# Patient Record
Sex: FEMALE | Race: WHITE | NOT HISPANIC OR LATINO | Employment: OTHER | URBAN - METROPOLITAN AREA
[De-identification: names, ages, dates, MRNs, and addresses within clinical notes are randomized per-mention and may not be internally consistent; named-entity substitution may affect disease eponyms.]

---

## 2023-06-07 ENCOUNTER — HOSPITAL ENCOUNTER (OUTPATIENT)
Dept: DATA CONVERSION | Facility: HOSPITAL | Age: 73
End: 2023-06-07
Attending: SURGERY | Admitting: SURGERY
Payer: MEDICARE

## 2023-06-07 DIAGNOSIS — I10 ESSENTIAL (PRIMARY) HYPERTENSION: ICD-10-CM

## 2023-06-07 DIAGNOSIS — D64.9 ANEMIA, UNSPECIFIED: ICD-10-CM

## 2023-06-07 DIAGNOSIS — E78.5 HYPERLIPIDEMIA, UNSPECIFIED: ICD-10-CM

## 2023-06-07 DIAGNOSIS — Z86.16 PERSONAL HISTORY OF COVID-19: ICD-10-CM

## 2023-06-07 DIAGNOSIS — C18.9 MALIGNANT NEOPLASM OF COLON, UNSPECIFIED (MULTI): ICD-10-CM

## 2023-06-07 DIAGNOSIS — G62.9 POLYNEUROPATHY, UNSPECIFIED: ICD-10-CM

## 2023-08-16 ENCOUNTER — HOSPITAL ENCOUNTER (OUTPATIENT)
Dept: DATA CONVERSION | Facility: HOSPITAL | Age: 73
End: 2023-08-16
Attending: INTERNAL MEDICINE | Admitting: INTERNAL MEDICINE
Payer: MEDICARE

## 2023-08-16 DIAGNOSIS — D17.79 BENIGN LIPOMATOUS NEOPLASM OF OTHER SITES: ICD-10-CM

## 2023-08-16 DIAGNOSIS — D12.2 BENIGN NEOPLASM OF ASCENDING COLON: ICD-10-CM

## 2023-08-16 DIAGNOSIS — D12.8 BENIGN NEOPLASM OF RECTUM: ICD-10-CM

## 2023-08-16 DIAGNOSIS — K64.8 OTHER HEMORRHOIDS: ICD-10-CM

## 2023-08-16 DIAGNOSIS — K64.4 RESIDUAL HEMORRHOIDAL SKIN TAGS: ICD-10-CM

## 2023-08-16 DIAGNOSIS — K56.699 OTHER INTESTINAL OBSTRUCTION UNSPECIFIED AS TO PARTIAL VERSUS COMPLETE OBSTRUCTION (MULTI): ICD-10-CM

## 2023-08-16 DIAGNOSIS — Z98.0 INTESTINAL BYPASS AND ANASTOMOSIS STATUS: ICD-10-CM

## 2023-08-16 DIAGNOSIS — Z85.038 PERSONAL HISTORY OF OTHER MALIGNANT NEOPLASM OF LARGE INTESTINE: ICD-10-CM

## 2023-08-16 DIAGNOSIS — Z86.010 PERSONAL HISTORY OF COLONIC POLYPS: ICD-10-CM

## 2023-08-16 DIAGNOSIS — Z12.11 ENCOUNTER FOR SCREENING FOR MALIGNANT NEOPLASM OF COLON: ICD-10-CM

## 2023-08-17 LAB
COMPLETE PATHOLOGY REPORT: NORMAL
CONVERTED CLINICAL DIAGNOSIS-HISTORY: NORMAL
CONVERTED FINAL DIAGNOSIS: NORMAL
CONVERTED FINAL REPORT PDF LINK TO COPY AND PASTE: NORMAL
CONVERTED GROSS DESCRIPTION: NORMAL

## 2023-09-07 VITALS — WEIGHT: 177.03 LBS | BODY MASS INDEX: 25.34 KG/M2 | HEIGHT: 70 IN

## 2023-09-29 VITALS
WEIGHT: 177.03 LBS | TEMPERATURE: 97.9 F | SYSTOLIC BLOOD PRESSURE: 137 MMHG | RESPIRATION RATE: 18 BRPM | DIASTOLIC BLOOD PRESSURE: 99 MMHG | BODY MASS INDEX: 25.34 KG/M2 | HEART RATE: 73 BPM | HEIGHT: 70 IN

## 2023-09-30 NOTE — H&P
History & Physical Reviewed:   I have reviewed the History and Physical dated:  23-May-2023   History and Physical reviewed and relevant findings noted. Patient examined to review pertinent physical  findings.: No significant changes   Home Medications Reviewed: no changes noted   Allergies Reviewed: no changes noted       ERAS (Enhanced Recovery After Surgery):  ·  ERAS Patient: no     Consent:   COVID-19 Consent:  ·  COVID-19 Risk Consent Surgeon has reviewed key risks related to the risk of guille COVID-19 and if they contract COVID-19 what the risks are.       Electronic Signatures:  Sushma Hendricks)  (Signed 07-Jun-2023 09:31)   Authored: History & Physical Reviewed, ERAS, Consent,  Note Completion      Last Updated: 07-Jun-2023 09:31 by Sushma Hendricks)

## 2023-09-30 NOTE — H&P
History of Present Illness:   History Present Illness:  Reason for surgery: History of colon cancer, tubulovillous  adenoma   HPI:    Patient is scheduled for surveillance colonoscopy.  Diagnosed colon cancer SP sigmoid resection SP colostomy in November 2022.  Large tubulovillous polyp removed from ascending colon on 1/16/2023    Allergies:        Allergies:  ·  penicillins : Anaphylaxis    Home Medication Review:   Home Medications Reviewed: yes     Impression/Procedure:   ·  Impression and Planned Procedure: Colonoscopy       ERAS (Enhanced Recovery After Surgery):  ·  ERAS Patient: no       Vital Signs:  Temperature C: 36.6 degrees C   Temperature F: 97.8 degrees F   Heart Rate: 73 beats per minute   Respiratory Rate: 18 breath per minute   Blood Pressure Systolic: 137 mm/Hg   Blood Pressure Diastolic: 99 mm/Hg     Physical Exam by System:    Respiratory/Thorax: Patent airways, CTAB, normal  breath sounds with good chest expansion, thorax symmetric   Cardiovascular: Regular, rate and rhythm, no murmurs,  2+ equal pulses of the extremities, normal S 1and S 2     Consent:   COVID-19 Consent:  ·  COVID-19 Risk Consent Surgeon has reviewed key risks related to the risk of guille COVID-19 and if they contract COVID-19 what the risks are.       Electronic Signatures:  Doug Frederick)  (Signed 16-Aug-2023 09:32)   Authored: History of Present Illness, Allergies, Home  Medication Review, Impression/Procedure, ERAS, Physical Exam, Consent, Note Completion      Last Updated: 16-Aug-2023 09:32 by Doug Frederick)

## 2023-10-02 NOTE — OP NOTE
Post Operative Note:     PreOp Diagnosis: mediport in place   Post-Procedure Diagnosis: same   Procedure: 1. mediport removal   2.   3.   4.   5.   Surgeon: Ruthie   Resident/Fellow/Other Assistant:    Anesthesia: MAC   Estimated Blood Loss (mL): 2   Specimen: no   Complications: none   Findings: see below   Patient Returned To/Condition: pacu, stable   Additional Details: Informed consent was obtained.   The patient was lying supine on operating bed. After MAC anesthesia was obtained, her right chest and neck was prepped   and draped.  Local anesthetics was injected around the port.  Skin incision was   made at the previous scar.  It was carried down through the subcutaneous   tissue.  The port and catheter were identified.  The catheter was controlled   with a hemostat. The port was then removed from the pocket. We then removed the catheter with no difficulty. Manual pressure was applied at the neck vein entry site. The track   was closed with 3-0 Vicryl. We then excised the capsule in the pocket. the   wound was irrigated. Hemostasis was confirmed. the wound was closed in layers: 3-0 Vicryl for subcutaneous tissue and 4-0 Vicryl for the skin. Skin glue was then applied.    patient tolerated the procedure well.       Electronic Signatures:  Sushma Hendricks)  (Signed 07-Jun-2023 10:22)   Authored: Post Operative Note, Note Completion      Last Updated: 07-Jun-2023 10:22 by Sushma Hendricks)

## 2023-10-12 PROBLEM — R10.9 ABDOMINAL PAIN, ACUTE: Status: ACTIVE | Noted: 2023-10-12

## 2023-10-12 PROBLEM — S72.002A CLOSED FRACTURE OF LEFT HIP (MULTI): Status: ACTIVE | Noted: 2023-10-12

## 2023-10-12 PROBLEM — K63.89 MASS OF COLON: Status: ACTIVE | Noted: 2023-10-12

## 2023-10-12 PROBLEM — Z90.49 HISTORY OF PARTIAL COLECTOMY: Status: ACTIVE | Noted: 2023-10-12

## 2023-10-12 PROBLEM — K57.32 DIVERTICULITIS OF COLON: Status: ACTIVE | Noted: 2023-10-12

## 2023-10-12 PROBLEM — C18.9 ADENOCARCINOMA, COLON (MULTI): Status: ACTIVE | Noted: 2023-10-12

## 2023-10-12 RX ORDER — ACETAMINOPHEN 500 MG
2 TABLET ORAL EVERY 6 HOURS PRN
COMMUNITY
Start: 2021-11-12

## 2023-10-12 RX ORDER — CHLORHEXIDINE GLUCONATE ORAL RINSE 1.2 MG/ML
SOLUTION DENTAL
COMMUNITY
Start: 2022-12-09 | End: 2023-10-13 | Stop reason: SDUPTHER

## 2023-10-12 RX ORDER — RANITIDINE HCL 150 MG
TABLET ORAL
COMMUNITY
Start: 2021-11-16 | End: 2023-10-13 | Stop reason: ALTCHOICE

## 2023-10-12 RX ORDER — SYRINGE, DISPOSABLE, 10 ML
SYRINGE, EMPTY DISPOSABLE MISCELLANEOUS
COMMUNITY
Start: 2021-11-16 | End: 2023-10-13 | Stop reason: ALTCHOICE

## 2023-10-12 RX ORDER — METRONIDAZOLE 500 MG/1
TABLET ORAL
COMMUNITY
Start: 2022-11-29 | End: 2023-10-13 | Stop reason: ALTCHOICE

## 2023-10-12 RX ORDER — DOCUSATE SODIUM 100 MG/1
1 CAPSULE, LIQUID FILLED ORAL 2 TIMES DAILY PRN
COMMUNITY
Start: 2022-08-31 | End: 2023-10-13 | Stop reason: ALTCHOICE

## 2023-10-12 RX ORDER — NEOMYCIN SULFATE 500 MG/1
TABLET ORAL
COMMUNITY
Start: 2022-11-30 | End: 2023-10-13 | Stop reason: ALTCHOICE

## 2023-10-13 ENCOUNTER — ANCILLARY PROCEDURE (OUTPATIENT)
Dept: PREADMISSION TESTING | Facility: HOSPITAL | Age: 73
End: 2023-10-13
Payer: MEDICARE

## 2023-10-13 VITALS
SYSTOLIC BLOOD PRESSURE: 122 MMHG | HEART RATE: 69 BPM | DIASTOLIC BLOOD PRESSURE: 61 MMHG | HEIGHT: 70 IN | OXYGEN SATURATION: 100 % | WEIGHT: 185.19 LBS | TEMPERATURE: 97.7 F | BODY MASS INDEX: 26.51 KG/M2 | RESPIRATION RATE: 18 BRPM

## 2023-10-13 DIAGNOSIS — C18.9 MALIGNANT NEOPLASM OF COLON, UNSPECIFIED PART OF COLON (MULTI): ICD-10-CM

## 2023-10-13 DIAGNOSIS — K57.32 DIVERTICULITIS OF COLON: ICD-10-CM

## 2023-10-13 DIAGNOSIS — Z01.818 PREOP EXAMINATION: Primary | ICD-10-CM

## 2023-10-13 DIAGNOSIS — Z90.49 H/O PARTIAL RESECTION OF COLON: ICD-10-CM

## 2023-10-13 LAB
ABO GROUP (TYPE) IN BLOOD: NORMAL
ALBUMIN SERPL BCP-MCNC: 3.9 G/DL (ref 3.4–5)
ALP SERPL-CCNC: 83 U/L (ref 33–136)
ALT SERPL W P-5'-P-CCNC: 14 U/L (ref 7–45)
ANION GAP SERPL CALC-SCNC: 10 MMOL/L (ref 10–20)
ANTIBODY SCREEN: NORMAL
AST SERPL W P-5'-P-CCNC: 19 U/L (ref 9–39)
BASOPHILS # BLD AUTO: 0.04 X10*3/UL (ref 0–0.1)
BASOPHILS NFR BLD AUTO: 0.6 %
BILIRUB SERPL-MCNC: 0.4 MG/DL (ref 0–1.2)
BUN SERPL-MCNC: 19 MG/DL (ref 6–23)
CALCIUM SERPL-MCNC: 9.1 MG/DL (ref 8.6–10.3)
CHLORIDE SERPL-SCNC: 103 MMOL/L (ref 98–107)
CO2 SERPL-SCNC: 28 MMOL/L (ref 21–32)
CREAT SERPL-MCNC: 0.99 MG/DL (ref 0.5–1.05)
EOSINOPHIL # BLD AUTO: 0.11 X10*3/UL (ref 0–0.4)
EOSINOPHIL NFR BLD AUTO: 1.8 %
ERYTHROCYTE [DISTWIDTH] IN BLOOD BY AUTOMATED COUNT: 14.2 % (ref 11.5–14.5)
GFR SERPL CREATININE-BSD FRML MDRD: 60 ML/MIN/1.73M*2
GLUCOSE SERPL-MCNC: 93 MG/DL (ref 74–99)
HCT VFR BLD AUTO: 37.4 % (ref 36–46)
HGB BLD-MCNC: 11.6 G/DL (ref 12–16)
IMM GRANULOCYTES # BLD AUTO: 0.01 X10*3/UL (ref 0–0.5)
IMM GRANULOCYTES NFR BLD AUTO: 0.2 % (ref 0–0.9)
INR PPP: 1 (ref 0.9–1.1)
LYMPHOCYTES # BLD AUTO: 1.6 X10*3/UL (ref 0.8–3)
LYMPHOCYTES NFR BLD AUTO: 25.5 %
MCH RBC QN AUTO: 27.1 PG (ref 26–34)
MCHC RBC AUTO-ENTMCNC: 31 G/DL (ref 32–36)
MCV RBC AUTO: 87 FL (ref 80–100)
MONOCYTES # BLD AUTO: 0.43 X10*3/UL (ref 0.05–0.8)
MONOCYTES NFR BLD AUTO: 6.9 %
NEUTROPHILS # BLD AUTO: 4.08 X10*3/UL (ref 1.6–5.5)
NEUTROPHILS NFR BLD AUTO: 65 %
NRBC BLD-RTO: 0 /100 WBCS (ref 0–0)
PLATELET # BLD AUTO: 202 X10*3/UL (ref 150–450)
PMV BLD AUTO: 9.8 FL (ref 7.5–11.5)
POTASSIUM SERPL-SCNC: 5 MMOL/L (ref 3.5–5.3)
PROT SERPL-MCNC: 6.7 G/DL (ref 6.4–8.2)
PROTHROMBIN TIME: 11.6 SECONDS (ref 9.8–12.8)
RBC # BLD AUTO: 4.28 X10*6/UL (ref 4–5.2)
RH FACTOR (ANTIGEN D): NORMAL
SODIUM SERPL-SCNC: 136 MMOL/L (ref 136–145)
WBC # BLD AUTO: 6.3 X10*3/UL (ref 4.4–11.3)

## 2023-10-13 PROCEDURE — 85610 PROTHROMBIN TIME: CPT

## 2023-10-13 PROCEDURE — 93010 ELECTROCARDIOGRAM REPORT: CPT | Performed by: INTERNAL MEDICINE

## 2023-10-13 PROCEDURE — 87081 CULTURE SCREEN ONLY: CPT | Mod: CMCLAB,GEALAB

## 2023-10-13 PROCEDURE — 85025 COMPLETE CBC W/AUTO DIFF WBC: CPT

## 2023-10-13 PROCEDURE — 99213 OFFICE O/P EST LOW 20 MIN: CPT | Performed by: PHYSICIAN ASSISTANT

## 2023-10-13 PROCEDURE — 93005 ELECTROCARDIOGRAM TRACING: CPT

## 2023-10-13 PROCEDURE — 80053 COMPREHEN METABOLIC PANEL: CPT

## 2023-10-13 PROCEDURE — 86901 BLOOD TYPING SEROLOGIC RH(D): CPT

## 2023-10-13 RX ORDER — FERROUS SULFATE 325(65) MG
65 TABLET, DELAYED RELEASE (ENTERIC COATED) ORAL
COMMUNITY

## 2023-10-13 RX ORDER — CHLORHEXIDINE GLUCONATE ORAL RINSE 1.2 MG/ML
15 SOLUTION DENTAL DAILY
Qty: 473 ML | Refills: 0 | Status: SHIPPED | OUTPATIENT
Start: 2023-10-13 | End: 2024-01-11

## 2023-10-13 RX ORDER — CHLORHEXIDINE GLUCONATE ORAL RINSE 1.2 MG/ML
15 SOLUTION DENTAL DAILY
Qty: 473 ML | Refills: 0 | Status: SHIPPED | OUTPATIENT
Start: 2023-10-13 | End: 2023-10-13 | Stop reason: SDUPTHER

## 2023-10-13 RX ORDER — CYANOCOBALAMIN (VITAMIN B-12) 250 MCG
250 TABLET ORAL DAILY
COMMUNITY

## 2023-10-13 ASSESSMENT — DUKE ACTIVITY SCORE INDEX (DASI)
CAN YOU DO LIGHT WORK AROUND THE HOUSE LIKE DUSTING OR WASHING DISHES: YES
CAN YOU PARTICIPATE IN STRENOUS SPORTS LIKE SWIMMING, SINGLES TENNIS, FOOTBALL, BASKETBALL, OR SKIING: NO
CAN YOU TAKE CARE OF YOURSELF (EAT, DRESS, BATHE, OR USE TOILET): YES
CAN YOU DO HEAVY WORK AROUND THE HOUSE LIKE SCRUBBING FLOORS OR LIFTING AND MOVING HEAVY FURNITURE: YES
TOTAL_SCORE: 42.7
CAN YOU WALK A BLOCK OR TWO ON LEVEL GROUND: YES
CAN YOU DO YARD WORK LIKE RAKING LEAVES, WEEDING OR PUSHING A MOWER: YES
CAN YOU RUN A SHORT DISTANCE: NO
CAN YOU HAVE SEXUAL RELATIONS: YES
CAN YOU PARTICIPATE IN MODERATE RECREATIONAL ACTIVITIES LIKE GOLF, BOWLING, DANCING, DOUBLES TENNIS OR THROWING A BASEBALL OR FOOTBALL: YES
CAN YOU DO MODERATE WORK AROUND THE HOUSE LIKE VACUUMING, SWEEPING FLOORS OR CARRYING GROCERIES: YES
CAN YOU CLIMB A FLIGHT OF STAIRS OR WALK UP A HILL: YES
DASI METS SCORE: 8
CAN YOU WALK INDOORS, SUCH AS AROUND YOUR HOUSE: YES

## 2023-10-13 ASSESSMENT — CHADS2 SCORE
CHF: NO
PRIOR STROKE OR TIA OR THROMBOEMBOLISM: NO
CHADS2 SCORE: 0
HYPERTENSION: NO
DIABETES: NO
AGE GREATER THAN OR EQUAL TO 75: NO

## 2023-10-13 ASSESSMENT — LIFESTYLE VARIABLES: SMOKING_STATUS: NONSMOKER

## 2023-10-13 ASSESSMENT — ACTIVITIES OF DAILY LIVING (ADL): ADL_SCORE: 0

## 2023-10-13 NOTE — CPM/PAT H&P
CPM/PAT Evaluation       Name: Aurelia Thayer (Aurelia Thayer)  /Age: 1950/73 y.o.     In-Person       Chief Complaint: colon polyp    Patient has a history of colon cancer with resection in . Treated with chemo and radiation s/p colon resection. Recent colonoscopy reveals a flat, abnormal polyp that is not able to be removed through colonoscopy.         Past Medical History:   Diagnosis Date    Colon cancer (CMS/HCC)     Peripheral neuropathy        Past Surgical History:   Procedure Laterality Date    APPENDECTOMY      CHOLECYSTECTOMY      CT GUIDED PERCUTANEOUS PERITONEAL OR RETROPERITONEAL FLUID COLLECTION DRAINAGE  2021    CT GUIDED PERCUTANEOUS PERITONEAL OR RETROPERITONEAL FLUID COLLECTION DRAINAGE 2021 [facility] CLINICAL LEGACY    ECTOPIC PREGNANCY SURGERY      MEDIPORT      ORIF HIP FRACTURE      left     ORIF RADIAL SHAFT FRACTURE      OTHER SURGICAL HISTORY  2021    Colectomy subtotal    OTHER SURGICAL HISTORY  2021    Colostomy       Patient  reports being sexually active.    Family History   Problem Relation Name Age of Onset    Heart attack Father  60 - 69       Allergies   Allergen Reactions    Penicillins Anaphylaxis       Prior to Admission medications    Medication Sig Start Date End Date Taking? Authorizing Provider   acetaminophen (Tylenol) 500 mg tablet Take 2 tablets (1,000 mg) by mouth every 6 hours if needed. 21   Historical Provider, MD   chlorhexidine (Peridex) 0.12 % solution Use 15 mL in the mouth or throat once daily. Swish and spit one capful the night before surgery and morning  of surgery 10/13/23 1/11/24  [facility]   cyanocobalamin (Vitamin B-12) 250 mcg tablet Take 1 tablet (250 mcg) by mouth once daily.    Historical Provider, MD   ECHINACEA ORAL Take 1 tablet by mouth every other day.    Historical Provider, MD   ferrous sulfate 325 (65 Fe) MG EC tablet Take 65 mg by mouth once daily with a meal. Do not crush,  chew, or split.    Historical Provider, MD   chlorhexidine (Peridex) 0.12 % solution Take per directed 12/9/22 10/13/23  Historical Provider, MD   chlorhexidine (Peridex) 0.12 % solution Use 15 mL in the mouth or throat once daily. Swish and spit one capful the night before surgery and morning  of surgery 10/13/23 10/13/23  [facility]   docusate sodium (Colace) 100 mg capsule Take 1 capsule (100 mg) by mouth 2 times a day as needed for constipation. 8/31/22 10/13/23  Historical Provider, MD   metroNIDAZOLE (Flagyl) 500 mg tablet Take per directed 11/29/22 10/13/23  Historical Provider, MD   neomycin (Mycifradin) 500 mg tablet Take per directed 11/30/22 10/13/23  Historical Provider, MD   soft lens rinse,store solution (Saline Solution) solution Take per directed 11/16/21 10/13/23  Historical Provider, MD   syringe, disposable, (Easy Glide Luer Lock Syringe) 10 mL syringe Take per directed 11/16/21 10/13/23  Historical Provider, MD        [unfilled]    Physical Exam  Vitals and nursing note reviewed.   Constitutional:       Appearance: Normal appearance.   HENT:      Head: Normocephalic and atraumatic.      Nose: Nose normal.      Mouth/Throat:      Mouth: Mucous membranes are moist.      Pharynx: Oropharynx is clear.   Eyes:      Conjunctiva/sclera: Conjunctivae normal.      Pupils: Pupils are equal, round, and reactive to light.   Cardiovascular:      Rate and Rhythm: Normal rate and regular rhythm.      Pulses: Normal pulses.      Heart sounds: Normal heart sounds.   Abdominal:      General: Abdomen is flat. Bowel sounds are normal.      Palpations: Abdomen is soft.   Musculoskeletal:         General: Normal range of motion.   Skin:     General: Skin is warm and dry.   Neurological:      General: No focal deficit present.      Mental Status: She is alert.   Psychiatric:         Mood and Affect: Mood normal.         Behavior: Behavior normal.          PAT AIRWAY:   Airway:     Neck ROM::   Full  normal        Vitals:    10/13/23 1035   BP: 122/61   Pulse: 69   Resp: 18   Temp: 36.5 °C (97.7 °F)   SpO2: 100%          DASI Risk Score      Flowsheet Row Most Recent Value   DASI SCORE 42.7   METS Score (Will be calculated only when all the questions are answered) 8          Caprini DVT Assessment      Flowsheet Row Most Recent Value   DVT Score 11   Current Status Major surgery planned, lasting 2-3 hours, Present cancer or chemotherapy   History Prior major surgery, Previous malignancy   Age 60-75 years   BMI 30 or less          Modified Frailty Index      Flowsheet Row Most Recent Value   Modified Frailty Index Calculator 0          CHADS2 Stroke Risk  Current as of 10 minutes ago        N/A 3 - 100%: High Risk   2 - 3%: Medium Risk   0 - 2%: Low Risk     Last Change: N/A          This score determines the patient's risk of having a stroke if the patient has atrial fibrillation.        This score is not applicable to this patient. Components are not calculated.          Revised Cardiac Risk Index      Flowsheet Row Most Recent Value   Revised Cardiac Risk Calculator 0          Apfel Simplified Score      Flowsheet Row Most Recent Value   Apfel Simplified Score Calculator 3          Risk Analysis Index Results This Encounter         10/13/2023  1049             HAMMONDS Cancer History: Patient indicates history of cancer    Total Risk Analysis Index Score Without Cancer: 22    Total Risk Analysis Index Score: 34          Stop Bang Score      Flowsheet Row Most Recent Value   Do you snore loudly? 1   Do you often feel tired or fatigued after your sleep? 0   Has anyone ever observed you stop breathing in your sleep? 0   Do you have or are you being treated for high blood pressure? 0   Recent BMI (Calculated) 25.4   Is BMI greater than 35 kg/m2? 0=No   Age older than 50 years old? 1=Yes            Assessment and Plan:   Patient is a 73 year old male/female referred to PAT by Dr. Hendricks Anticipating a colon resection  on  10/20/23.     PMH significant for:   Colon cancer with colon resection followed by colostomy and reversal of colostomy.     Anesthesia issues: none    H/O DVT: no    Sleep apnea: no     H/O transfusions: no     EKG: 10/13/23 NSR    Clearances: none    Patient verbalized understanding of preop instructions given in PAT

## 2023-10-13 NOTE — H&P (VIEW-ONLY)
CPM/PAT Evaluation       Name: Aurelia Thayer (Aurelia Thayer)  /Age: 1950/73 y.o.     In-Person       Chief Complaint: colon polyp    Patient has a history of colon cancer with resection in . Treated with chemo and radiation s/p colon resection. Recent colonoscopy reveals a flat, abnormal polyp that is not able to be removed through colonoscopy.         Past Medical History:   Diagnosis Date    Colon cancer (CMS/HCC)     Peripheral neuropathy        Past Surgical History:   Procedure Laterality Date    APPENDECTOMY      CHOLECYSTECTOMY      CT GUIDED PERCUTANEOUS PERITONEAL OR RETROPERITONEAL FLUID COLLECTION DRAINAGE  2021    CT GUIDED PERCUTANEOUS PERITONEAL OR RETROPERITONEAL FLUID COLLECTION DRAINAGE 2021 [facility] CLINICAL LEGACY    ECTOPIC PREGNANCY SURGERY      MEDIPORT      ORIF HIP FRACTURE      left     ORIF RADIAL SHAFT FRACTURE      OTHER SURGICAL HISTORY  2021    Colectomy subtotal    OTHER SURGICAL HISTORY  2021    Colostomy       Patient  reports being sexually active.    Family History   Problem Relation Name Age of Onset    Heart attack Father  60 - 69       Allergies   Allergen Reactions    Penicillins Anaphylaxis       Prior to Admission medications    Medication Sig Start Date End Date Taking? Authorizing Provider   acetaminophen (Tylenol) 500 mg tablet Take 2 tablets (1,000 mg) by mouth every 6 hours if needed. 21   Historical Provider, MD   chlorhexidine (Peridex) 0.12 % solution Use 15 mL in the mouth or throat once daily. Swish and spit one capful the night before surgery and morning  of surgery 10/13/23 1/11/24  [facility]   cyanocobalamin (Vitamin B-12) 250 mcg tablet Take 1 tablet (250 mcg) by mouth once daily.    Historical Provider, MD   ECHINACEA ORAL Take 1 tablet by mouth every other day.    Historical Provider, MD   ferrous sulfate 325 (65 Fe) MG EC tablet Take 65 mg by mouth once daily with a meal. Do not crush,  chew, or split.    Historical Provider, MD   chlorhexidine (Peridex) 0.12 % solution Take per directed 12/9/22 10/13/23  Historical Provider, MD   chlorhexidine (Peridex) 0.12 % solution Use 15 mL in the mouth or throat once daily. Swish and spit one capful the night before surgery and morning  of surgery 10/13/23 10/13/23  [facility]   docusate sodium (Colace) 100 mg capsule Take 1 capsule (100 mg) by mouth 2 times a day as needed for constipation. 8/31/22 10/13/23  Historical Provider, MD   metroNIDAZOLE (Flagyl) 500 mg tablet Take per directed 11/29/22 10/13/23  Historical Provider, MD   neomycin (Mycifradin) 500 mg tablet Take per directed 11/30/22 10/13/23  Historical Provider, MD   soft lens rinse,store solution (Saline Solution) solution Take per directed 11/16/21 10/13/23  Historical Provider, MD   syringe, disposable, (Easy Glide Luer Lock Syringe) 10 mL syringe Take per directed 11/16/21 10/13/23  Historical Provider, MD        [unfilled]    Physical Exam  Vitals and nursing note reviewed.   Constitutional:       Appearance: Normal appearance.   HENT:      Head: Normocephalic and atraumatic.      Nose: Nose normal.      Mouth/Throat:      Mouth: Mucous membranes are moist.      Pharynx: Oropharynx is clear.   Eyes:      Conjunctiva/sclera: Conjunctivae normal.      Pupils: Pupils are equal, round, and reactive to light.   Cardiovascular:      Rate and Rhythm: Normal rate and regular rhythm.      Pulses: Normal pulses.      Heart sounds: Normal heart sounds.   Abdominal:      General: Abdomen is flat. Bowel sounds are normal.      Palpations: Abdomen is soft.   Musculoskeletal:         General: Normal range of motion.   Skin:     General: Skin is warm and dry.   Neurological:      General: No focal deficit present.      Mental Status: She is alert.   Psychiatric:         Mood and Affect: Mood normal.         Behavior: Behavior normal.          PAT AIRWAY:   Airway:     Neck ROM::   Full  normal        Vitals:    10/13/23 1035   BP: 122/61   Pulse: 69   Resp: 18   Temp: 36.5 °C (97.7 °F)   SpO2: 100%          DASI Risk Score      Flowsheet Row Most Recent Value   DASI SCORE 42.7   METS Score (Will be calculated only when all the questions are answered) 8          Caprini DVT Assessment      Flowsheet Row Most Recent Value   DVT Score 11   Current Status Major surgery planned, lasting 2-3 hours, Present cancer or chemotherapy   History Prior major surgery, Previous malignancy   Age 60-75 years   BMI 30 or less          Modified Frailty Index      Flowsheet Row Most Recent Value   Modified Frailty Index Calculator 0          CHADS2 Stroke Risk  Current as of 10 minutes ago        N/A 3 - 100%: High Risk   2 - 3%: Medium Risk   0 - 2%: Low Risk     Last Change: N/A          This score determines the patient's risk of having a stroke if the patient has atrial fibrillation.        This score is not applicable to this patient. Components are not calculated.          Revised Cardiac Risk Index      Flowsheet Row Most Recent Value   Revised Cardiac Risk Calculator 0          Apfel Simplified Score      Flowsheet Row Most Recent Value   Apfel Simplified Score Calculator 3          Risk Analysis Index Results This Encounter         10/13/2023  1049             HAMMONDS Cancer History: Patient indicates history of cancer    Total Risk Analysis Index Score Without Cancer: 22    Total Risk Analysis Index Score: 34          Stop Bang Score      Flowsheet Row Most Recent Value   Do you snore loudly? 1   Do you often feel tired or fatigued after your sleep? 0   Has anyone ever observed you stop breathing in your sleep? 0   Do you have or are you being treated for high blood pressure? 0   Recent BMI (Calculated) 25.4   Is BMI greater than 35 kg/m2? 0=No   Age older than 50 years old? 1=Yes            Assessment and Plan:   Patient is a 73 year old male/female referred to PAT by Dr. Hendricks Anticipating a colon resection  on  10/20/23.     PMH significant for:   Colon cancer with colon resection followed by colostomy and reversal of colostomy.     Anesthesia issues: none    H/O DVT: no    Sleep apnea: no     H/O transfusions: no     EKG: 10/13/23 NSR    Clearances: none    Patient verbalized understanding of preop instructions given in PAT

## 2023-10-13 NOTE — PREPROCEDURE INSTRUCTIONS
No outpatient medications have been marked as taking for the 10/13/23 encounter (Clinical Support) with [facility].     You will receive a phone call the day before your procedure  after 2pm, (or the Friday before your surgery if scheduled on a Monday.) Generally the hospital will be calling you with this information after that time.         You are not to eat after midnight the night before the surgery. You may have 8oz of a clear liquid up until 2 hours prior to arriving to the hospital. The exception is with medications you were instructed to take day of surgery.         You may take tylenol for pain/discomfort as needed.         **Stop taking all aspirins, ibuprofen (motrin/advil), naproxen (aleve/naprosyn) for one week prior to surgery.         You should not have alcoholic beverages for 24 hours before surgery.         You should not smoke 24 hours prior to surgery.         To help prevent surgical infections bathe/shower with dial soap the evening before surgery.         If you have any further questions please call [phone]                     NPO Instructions:    Do not eat any food after midnight the night before your surgery/procedure.    Additional Instructions:     Review your medication instructions, stop indicated medications

## 2023-10-13 NOTE — PREPROCEDURE INSTRUCTIONS
No outpatient medications have been marked as taking for the 10/13/23 encounter (Clinical Support) with [facility].   CHG oral rinse is used to kill a bacteria in the mouth known as Staphylococcus aureus. This reduces the risks of surgical site infections.         Using dental rinse: use the CHG oral rinse after you brush your teeth the night before and the morning of the surgery.     Use 1 capful (15ml), swish and gargle for at least 30 seconds. Do not swallow. Spit rinse out.         Do not rinse mouth with water, eat or drink after using CHG mouth rinse.         Possible side effects: CHG rinse will stick to plaque on teeth. Brush and floss just before use. Teeth brushing will help to avoid staining of plaque during use.         Any questions, please call [phone]                       NPO Instructions:    Do not eat any food after midnight the night before your surgery/procedure.    Additional Instructions:     Review your medication instructions, stop indicated medications

## 2023-10-16 LAB — STAPHYLOCOCCUS SPEC CULT: NORMAL

## 2023-10-19 ENCOUNTER — ANESTHESIA EVENT (OUTPATIENT)
Dept: OPERATING ROOM | Facility: HOSPITAL | Age: 73
DRG: 331 | End: 2023-10-19
Payer: MEDICARE

## 2023-10-20 ENCOUNTER — HOSPITAL ENCOUNTER (INPATIENT)
Facility: HOSPITAL | Age: 73
LOS: 4 days | Discharge: HOME | DRG: 331 | End: 2023-10-24
Attending: SURGERY | Admitting: SURGERY
Payer: MEDICARE

## 2023-10-20 ENCOUNTER — ANESTHESIA (OUTPATIENT)
Dept: OPERATING ROOM | Facility: HOSPITAL | Age: 73
DRG: 331 | End: 2023-10-20
Payer: MEDICARE

## 2023-10-20 DIAGNOSIS — Z90.49 HISTORY OF PARTIAL COLECTOMY: ICD-10-CM

## 2023-10-20 DIAGNOSIS — K63.5 POLYP OF ASCENDING COLON, UNSPECIFIED TYPE: Primary | ICD-10-CM

## 2023-10-20 PROBLEM — H54.7 VISION LOSS: Status: ACTIVE | Noted: 2023-10-20

## 2023-10-20 PROBLEM — G62.9 PERIPHERAL NEUROPATHY: Status: ACTIVE | Noted: 2023-10-20

## 2023-10-20 PROCEDURE — 2500000005 HC RX 250 GENERAL PHARMACY W/O HCPCS: Performed by: SURGERY

## 2023-10-20 PROCEDURE — 88307 TISSUE EXAM BY PATHOLOGIST: CPT | Mod: TC,GEALAB | Performed by: SURGERY

## 2023-10-20 PROCEDURE — 88307 TISSUE EXAM BY PATHOLOGIST: CPT | Performed by: STUDENT IN AN ORGANIZED HEALTH CARE EDUCATION/TRAINING PROGRAM

## 2023-10-20 PROCEDURE — A44140 PR PART REMOVAL COLON W ANASTOMOSIS: Performed by: NURSE ANESTHETIST, CERTIFIED REGISTERED

## 2023-10-20 PROCEDURE — 3600000008 HC OR TIME - EACH INCREMENTAL 1 MINUTE - PROCEDURE LEVEL THREE: Performed by: SURGERY

## 2023-10-20 PROCEDURE — 3600000003 HC OR TIME - INITIAL BASE CHARGE - PROCEDURE LEVEL THREE: Performed by: SURGERY

## 2023-10-20 PROCEDURE — 2500000001 HC RX 250 WO HCPCS SELF ADMINISTERED DRUGS (ALT 637 FOR MEDICARE OP): Performed by: SURGERY

## 2023-10-20 PROCEDURE — 3700000002 HC GENERAL ANESTHESIA TIME - EACH INCREMENTAL 1 MINUTE: Performed by: SURGERY

## 2023-10-20 PROCEDURE — 96372 THER/PROPH/DIAG INJ SC/IM: CPT | Performed by: SURGERY

## 2023-10-20 PROCEDURE — 0DTK0ZZ RESECTION OF ASCENDING COLON, OPEN APPROACH: ICD-10-PCS | Performed by: SURGERY

## 2023-10-20 PROCEDURE — 2500000005 HC RX 250 GENERAL PHARMACY W/O HCPCS: Performed by: NURSE ANESTHETIST, CERTIFIED REGISTERED

## 2023-10-20 PROCEDURE — P9045 ALBUMIN (HUMAN), 5%, 250 ML: HCPCS | Mod: JZ | Performed by: NURSE ANESTHETIST, CERTIFIED REGISTERED

## 2023-10-20 PROCEDURE — 1100000001 HC PRIVATE ROOM DAILY

## 2023-10-20 PROCEDURE — 44160 REMOVAL OF COLON: CPT | Performed by: SURGERY

## 2023-10-20 PROCEDURE — A4217 STERILE WATER/SALINE, 500 ML: HCPCS | Performed by: SURGERY

## 2023-10-20 PROCEDURE — 88307 TISSUE EXAM BY PATHOLOGIST: CPT | Mod: TC,SUR,GEALAB | Performed by: SURGERY

## 2023-10-20 PROCEDURE — 2500000004 HC RX 250 GENERAL PHARMACY W/ HCPCS (ALT 636 FOR OP/ED): Performed by: NURSE ANESTHETIST, CERTIFIED REGISTERED

## 2023-10-20 PROCEDURE — 2500000004 HC RX 250 GENERAL PHARMACY W/ HCPCS (ALT 636 FOR OP/ED): Performed by: SURGERY

## 2023-10-20 PROCEDURE — 2500000004 HC RX 250 GENERAL PHARMACY W/ HCPCS (ALT 636 FOR OP/ED): Performed by: ANESTHESIOLOGY

## 2023-10-20 PROCEDURE — 7100000002 HC RECOVERY ROOM TIME - EACH INCREMENTAL 1 MINUTE: Performed by: SURGERY

## 2023-10-20 PROCEDURE — 7100000001 HC RECOVERY ROOM TIME - INITIAL BASE CHARGE: Performed by: SURGERY

## 2023-10-20 PROCEDURE — 0DN80ZZ RELEASE SMALL INTESTINE, OPEN APPROACH: ICD-10-PCS | Performed by: SURGERY

## 2023-10-20 PROCEDURE — 2720000007 HC OR 272 NO HCPCS: Performed by: SURGERY

## 2023-10-20 PROCEDURE — 0DNU0ZZ RELEASE OMENTUM, OPEN APPROACH: ICD-10-PCS | Performed by: SURGERY

## 2023-10-20 PROCEDURE — 3700000001 HC GENERAL ANESTHESIA TIME - INITIAL BASE CHARGE: Performed by: SURGERY

## 2023-10-20 RX ORDER — FENTANYL CITRATE 50 UG/ML
INJECTION, SOLUTION INTRAMUSCULAR; INTRAVENOUS AS NEEDED
Status: DISCONTINUED | OUTPATIENT
Start: 2023-10-20 | End: 2023-10-20

## 2023-10-20 RX ORDER — NALOXONE HYDROCHLORIDE 0.4 MG/ML
0.2 INJECTION, SOLUTION INTRAMUSCULAR; INTRAVENOUS; SUBCUTANEOUS EVERY 5 MIN PRN
Status: DISCONTINUED | OUTPATIENT
Start: 2023-10-20 | End: 2023-10-24 | Stop reason: HOSPADM

## 2023-10-20 RX ORDER — GLYCOPYRROLATE 0.2 MG/ML
INJECTION INTRAMUSCULAR; INTRAVENOUS AS NEEDED
Status: DISCONTINUED | OUTPATIENT
Start: 2023-10-20 | End: 2023-10-20

## 2023-10-20 RX ORDER — ALBUMIN HUMAN 50 G/1000ML
SOLUTION INTRAVENOUS AS NEEDED
Status: DISCONTINUED | OUTPATIENT
Start: 2023-10-20 | End: 2023-10-20

## 2023-10-20 RX ORDER — MORPHINE SULFATE 4 MG/ML
4 INJECTION INTRAVENOUS EVERY 2 HOUR PRN
Status: DISCONTINUED | OUTPATIENT
Start: 2023-10-20 | End: 2023-10-24 | Stop reason: HOSPADM

## 2023-10-20 RX ORDER — LIDOCAINE HYDROCHLORIDE 10 MG/ML
0.1 INJECTION, SOLUTION EPIDURAL; INFILTRATION; INTRACAUDAL; PERINEURAL ONCE
Status: DISCONTINUED | OUTPATIENT
Start: 2023-10-20 | End: 2023-10-20 | Stop reason: HOSPADM

## 2023-10-20 RX ORDER — CIPROFLOXACIN 2 MG/ML
400 INJECTION, SOLUTION INTRAVENOUS EVERY 12 HOURS
Status: COMPLETED | OUTPATIENT
Start: 2023-10-20 | End: 2023-10-21

## 2023-10-20 RX ORDER — NEOSTIGMINE METHYLSULFATE 1 MG/ML
INJECTION, SOLUTION INTRAVENOUS AS NEEDED
Status: DISCONTINUED | OUTPATIENT
Start: 2023-10-20 | End: 2023-10-20

## 2023-10-20 RX ORDER — HYDROMORPHONE HYDROCHLORIDE 2 MG/ML
INJECTION, SOLUTION INTRAMUSCULAR; INTRAVENOUS; SUBCUTANEOUS AS NEEDED
Status: DISCONTINUED | OUTPATIENT
Start: 2023-10-20 | End: 2023-10-20

## 2023-10-20 RX ORDER — CYCLOBENZAPRINE HCL 5 MG
5 TABLET ORAL 3 TIMES DAILY
Status: DISCONTINUED | OUTPATIENT
Start: 2023-10-20 | End: 2023-10-24 | Stop reason: HOSPADM

## 2023-10-20 RX ORDER — ONDANSETRON HYDROCHLORIDE 2 MG/ML
INJECTION, SOLUTION INTRAVENOUS AS NEEDED
Status: DISCONTINUED | OUTPATIENT
Start: 2023-10-20 | End: 2023-10-20

## 2023-10-20 RX ORDER — ROCURONIUM BROMIDE 10 MG/ML
INJECTION, SOLUTION INTRAVENOUS AS NEEDED
Status: DISCONTINUED | OUTPATIENT
Start: 2023-10-20 | End: 2023-10-20

## 2023-10-20 RX ORDER — SODIUM CHLORIDE, SODIUM LACTATE, POTASSIUM CHLORIDE, CALCIUM CHLORIDE 600; 310; 30; 20 MG/100ML; MG/100ML; MG/100ML; MG/100ML
100 INJECTION, SOLUTION INTRAVENOUS CONTINUOUS
Status: DISCONTINUED | OUTPATIENT
Start: 2023-10-20 | End: 2023-10-20

## 2023-10-20 RX ORDER — PHENYLEPHRINE HCL IN 0.9% NACL 0.4MG/10ML
SYRINGE (ML) INTRAVENOUS AS NEEDED
Status: DISCONTINUED | OUTPATIENT
Start: 2023-10-20 | End: 2023-10-20

## 2023-10-20 RX ORDER — LIDOCAINE HYDROCHLORIDE 20 MG/ML
INJECTION, SOLUTION INFILTRATION; PERINEURAL AS NEEDED
Status: DISCONTINUED | OUTPATIENT
Start: 2023-10-20 | End: 2023-10-20

## 2023-10-20 RX ORDER — SODIUM CHLORIDE 9 MG/ML
75 INJECTION, SOLUTION INTRAVENOUS CONTINUOUS
Status: DISCONTINUED | OUTPATIENT
Start: 2023-10-20 | End: 2023-10-24 | Stop reason: HOSPADM

## 2023-10-20 RX ORDER — METOPROLOL TARTRATE 1 MG/ML
INJECTION, SOLUTION INTRAVENOUS AS NEEDED
Status: DISCONTINUED | OUTPATIENT
Start: 2023-10-20 | End: 2023-10-20

## 2023-10-20 RX ORDER — CIPROFLOXACIN 2 MG/ML
400 INJECTION, SOLUTION INTRAVENOUS ONCE
Status: COMPLETED | OUTPATIENT
Start: 2023-10-20 | End: 2023-10-20

## 2023-10-20 RX ORDER — PROPOFOL 10 MG/ML
INJECTION, EMULSION INTRAVENOUS AS NEEDED
Status: DISCONTINUED | OUTPATIENT
Start: 2023-10-20 | End: 2023-10-20

## 2023-10-20 RX ORDER — BUPIVACAINE HYDROCHLORIDE 5 MG/ML
INJECTION, SOLUTION EPIDURAL; INTRACAUDAL AS NEEDED
Status: DISCONTINUED | OUTPATIENT
Start: 2023-10-20 | End: 2023-10-20 | Stop reason: HOSPADM

## 2023-10-20 RX ORDER — ONDANSETRON HYDROCHLORIDE 2 MG/ML
4 INJECTION, SOLUTION INTRAVENOUS EVERY 8 HOURS PRN
Status: DISCONTINUED | OUTPATIENT
Start: 2023-10-20 | End: 2023-10-24 | Stop reason: HOSPADM

## 2023-10-20 RX ORDER — ACETAMINOPHEN 325 MG/1
650 TABLET ORAL EVERY 4 HOURS PRN
Status: DISCONTINUED | OUTPATIENT
Start: 2023-10-20 | End: 2023-10-20 | Stop reason: HOSPADM

## 2023-10-20 RX ORDER — DOCUSATE SODIUM 100 MG/1
100 CAPSULE, LIQUID FILLED ORAL 2 TIMES DAILY
Status: DISCONTINUED | OUTPATIENT
Start: 2023-10-20 | End: 2023-10-24 | Stop reason: HOSPADM

## 2023-10-20 RX ORDER — METRONIDAZOLE 500 MG/100ML
500 INJECTION, SOLUTION INTRAVENOUS EVERY 8 HOURS
Status: COMPLETED | OUTPATIENT
Start: 2023-10-20 | End: 2023-10-21

## 2023-10-20 RX ORDER — SODIUM CHLORIDE 0.9 G/100ML
IRRIGANT IRRIGATION AS NEEDED
Status: DISCONTINUED | OUTPATIENT
Start: 2023-10-20 | End: 2023-10-20 | Stop reason: HOSPADM

## 2023-10-20 RX ORDER — ENOXAPARIN SODIUM 100 MG/ML
40 INJECTION SUBCUTANEOUS EVERY 24 HOURS
Status: DISCONTINUED | OUTPATIENT
Start: 2023-10-20 | End: 2023-10-24 | Stop reason: HOSPADM

## 2023-10-20 RX ORDER — METRONIDAZOLE 500 MG/100ML
500 INJECTION, SOLUTION INTRAVENOUS ONCE
Status: COMPLETED | OUTPATIENT
Start: 2023-10-20 | End: 2023-10-20

## 2023-10-20 RX ORDER — BISACODYL 10 MG/1
10 SUPPOSITORY RECTAL DAILY
Status: DISCONTINUED | OUTPATIENT
Start: 2023-10-21 | End: 2023-10-24 | Stop reason: HOSPADM

## 2023-10-20 RX ORDER — ONDANSETRON HYDROCHLORIDE 2 MG/ML
8 INJECTION, SOLUTION INTRAVENOUS ONCE
Status: DISCONTINUED | OUTPATIENT
Start: 2023-10-20 | End: 2023-10-20 | Stop reason: HOSPADM

## 2023-10-20 RX ORDER — OXYCODONE HYDROCHLORIDE 5 MG/1
5 TABLET ORAL EVERY 6 HOURS PRN
Status: DISCONTINUED | OUTPATIENT
Start: 2023-10-20 | End: 2023-10-24 | Stop reason: HOSPADM

## 2023-10-20 RX ORDER — ENOXAPARIN SODIUM 100 MG/ML
30 INJECTION SUBCUTANEOUS ONCE
Status: COMPLETED | OUTPATIENT
Start: 2023-10-20 | End: 2023-10-20

## 2023-10-20 RX ORDER — OXYCODONE HYDROCHLORIDE 10 MG/1
10 TABLET ORAL EVERY 4 HOURS PRN
Status: DISCONTINUED | OUTPATIENT
Start: 2023-10-20 | End: 2023-10-24 | Stop reason: HOSPADM

## 2023-10-20 RX ORDER — ONDANSETRON 4 MG/1
4 TABLET, FILM COATED ORAL EVERY 8 HOURS PRN
Status: DISCONTINUED | OUTPATIENT
Start: 2023-10-20 | End: 2023-10-24 | Stop reason: HOSPADM

## 2023-10-20 RX ORDER — LIDOCAINE HYDROCHLORIDE 10 MG/ML
INJECTION INFILTRATION; PERINEURAL AS NEEDED
Status: DISCONTINUED | OUTPATIENT
Start: 2023-10-20 | End: 2023-10-20 | Stop reason: HOSPADM

## 2023-10-20 RX ORDER — GABAPENTIN 100 MG/1
100 CAPSULE ORAL 2 TIMES DAILY
Status: DISCONTINUED | OUTPATIENT
Start: 2023-10-20 | End: 2023-10-24 | Stop reason: HOSPADM

## 2023-10-20 RX ORDER — ALBUTEROL SULFATE 0.83 MG/ML
2.5 SOLUTION RESPIRATORY (INHALATION) ONCE AS NEEDED
Status: DISCONTINUED | OUTPATIENT
Start: 2023-10-20 | End: 2023-10-20 | Stop reason: HOSPADM

## 2023-10-20 RX ADMIN — LIDOCAINE HYDROCHLORIDE 50 MG: 20 INJECTION, SOLUTION INFILTRATION; PERINEURAL at 08:50

## 2023-10-20 RX ADMIN — HYDROMORPHONE HYDROCHLORIDE 0.4 MG: 2 INJECTION, SOLUTION INTRAMUSCULAR; INTRAVENOUS; SUBCUTANEOUS at 10:15

## 2023-10-20 RX ADMIN — METRONIDAZOLE 500 MG: 500 INJECTION, SOLUTION INTRAVENOUS at 17:00

## 2023-10-20 RX ADMIN — ROCURONIUM BROMIDE 20 MG: 10 INJECTION INTRAVENOUS at 12:20

## 2023-10-20 RX ADMIN — METOPROLOL TARTRATE 2 MG: 1 INJECTION, SOLUTION INTRAVENOUS at 12:52

## 2023-10-20 RX ADMIN — Medication 80 MCG: at 09:25

## 2023-10-20 RX ADMIN — HYDROMORPHONE HYDROCHLORIDE 0.4 MG: 2 INJECTION, SOLUTION INTRAMUSCULAR; INTRAVENOUS; SUBCUTANEOUS at 11:14

## 2023-10-20 RX ADMIN — FENTANYL CITRATE 25 MCG: 50 INJECTION, SOLUTION INTRAMUSCULAR; INTRAVENOUS at 09:15

## 2023-10-20 RX ADMIN — SODIUM CHLORIDE, SODIUM LACTATE, POTASSIUM CHLORIDE, AND CALCIUM CHLORIDE: 600; 310; 30; 20 INJECTION, SOLUTION INTRAVENOUS at 08:41

## 2023-10-20 RX ADMIN — ALBUMIN (HUMAN) 250 ML: 12.5 INJECTION, SOLUTION INTRAVENOUS at 13:06

## 2023-10-20 RX ADMIN — CIPROFLOXACIN 400 MG: 2 INJECTION, SOLUTION INTRAVENOUS at 18:11

## 2023-10-20 RX ADMIN — SODIUM CHLORIDE 75 ML/HR: 9 INJECTION, SOLUTION INTRAVENOUS at 16:53

## 2023-10-20 RX ADMIN — CIPROFLOXACIN 400 MG: 2 INJECTION, SOLUTION INTRAVENOUS at 08:06

## 2023-10-20 RX ADMIN — CYCLOBENZAPRINE HYDROCHLORIDE 5 MG: 5 TABLET, FILM COATED ORAL at 16:53

## 2023-10-20 RX ADMIN — HYDROMORPHONE HYDROCHLORIDE 0.4 MG: 2 INJECTION, SOLUTION INTRAMUSCULAR; INTRAVENOUS; SUBCUTANEOUS at 13:23

## 2023-10-20 RX ADMIN — METRONIDAZOLE 500 MG: 500 INJECTION, SOLUTION INTRAVENOUS at 09:03

## 2023-10-20 RX ADMIN — ROCURONIUM BROMIDE 10 MG: 10 INJECTION INTRAVENOUS at 13:49

## 2023-10-20 RX ADMIN — GLYCOPYRROLATE 0.8 MG: 0.2 INJECTION, SOLUTION INTRAMUSCULAR; INTRAVENOUS at 14:41

## 2023-10-20 RX ADMIN — FENTANYL CITRATE 25 MCG: 50 INJECTION, SOLUTION INTRAMUSCULAR; INTRAVENOUS at 09:42

## 2023-10-20 RX ADMIN — ROCURONIUM BROMIDE 30 MG: 10 INJECTION INTRAVENOUS at 10:34

## 2023-10-20 RX ADMIN — ENOXAPARIN SODIUM 30 MG: 30 INJECTION SUBCUTANEOUS at 07:47

## 2023-10-20 RX ADMIN — FENTANYL CITRATE 25 MCG: 50 INJECTION, SOLUTION INTRAMUSCULAR; INTRAVENOUS at 09:55

## 2023-10-20 RX ADMIN — ROCURONIUM BROMIDE 20 MG: 10 INJECTION INTRAVENOUS at 11:34

## 2023-10-20 RX ADMIN — HYDROMORPHONE HYDROCHLORIDE 0.4 MG: 2 INJECTION, SOLUTION INTRAMUSCULAR; INTRAVENOUS; SUBCUTANEOUS at 12:00

## 2023-10-20 RX ADMIN — METOPROLOL TARTRATE 2 MG: 1 INJECTION, SOLUTION INTRAVENOUS at 13:23

## 2023-10-20 RX ADMIN — GABAPENTIN 100 MG: 100 CAPSULE ORAL at 20:47

## 2023-10-20 RX ADMIN — FENTANYL CITRATE 25 MCG: 50 INJECTION, SOLUTION INTRAMUSCULAR; INTRAVENOUS at 08:50

## 2023-10-20 RX ADMIN — ENOXAPARIN SODIUM 40 MG: 40 INJECTION SUBCUTANEOUS at 16:52

## 2023-10-20 RX ADMIN — HYDROMORPHONE HYDROCHLORIDE 0.4 MG: 2 INJECTION, SOLUTION INTRAMUSCULAR; INTRAVENOUS; SUBCUTANEOUS at 10:34

## 2023-10-20 RX ADMIN — ROCURONIUM BROMIDE 50 MG: 10 INJECTION INTRAVENOUS at 08:50

## 2023-10-20 RX ADMIN — PROPOFOL 100 MG: 10 INJECTION, EMULSION INTRAVENOUS at 08:50

## 2023-10-20 RX ADMIN — SODIUM CHLORIDE, POTASSIUM CHLORIDE, SODIUM LACTATE AND CALCIUM CHLORIDE 100 ML/HR: 600; 310; 30; 20 INJECTION, SOLUTION INTRAVENOUS at 08:05

## 2023-10-20 RX ADMIN — CYCLOBENZAPRINE HYDROCHLORIDE 5 MG: 5 TABLET, FILM COATED ORAL at 20:47

## 2023-10-20 RX ADMIN — ROCURONIUM BROMIDE 20 MG: 10 INJECTION INTRAVENOUS at 09:41

## 2023-10-20 RX ADMIN — NEOSTIGMINE METHYLSULFATE 4 MG: 1 INJECTION INTRAVENOUS at 14:41

## 2023-10-20 RX ADMIN — ONDANSETRON 4 MG: 2 INJECTION, SOLUTION INTRAMUSCULAR; INTRAVENOUS at 14:40

## 2023-10-20 RX ADMIN — DEXAMETHASONE SODIUM PHOSPHATE 4 MG: 4 INJECTION INTRA-ARTICULAR; INTRALESIONAL; INTRAMUSCULAR; INTRAVENOUS; SOFT TISSUE at 08:59

## 2023-10-20 SDOH — SOCIAL STABILITY: SOCIAL INSECURITY: ARE THERE ANY APPARENT SIGNS OF INJURIES/BEHAVIORS THAT COULD BE RELATED TO ABUSE/NEGLECT?: NO

## 2023-10-20 SDOH — SOCIAL STABILITY: SOCIAL NETWORK
DO YOU BELONG TO ANY CLUBS OR ORGANIZATIONS SUCH AS CHURCH GROUPS UNIONS, FRATERNAL OR ATHLETIC GROUPS, OR SCHOOL GROUPS?: YES

## 2023-10-20 SDOH — ECONOMIC STABILITY: INCOME INSECURITY: IN THE LAST 12 MONTHS, WAS THERE A TIME WHEN YOU WERE NOT ABLE TO PAY THE MORTGAGE OR RENT ON TIME?: NO

## 2023-10-20 SDOH — SOCIAL STABILITY: SOCIAL NETWORK: HOW OFTEN DO YOU ATTEND CHURCH OR RELIGIOUS SERVICES?: NEVER

## 2023-10-20 SDOH — SOCIAL STABILITY: SOCIAL INSECURITY
WITHIN THE LAST YEAR, HAVE YOU BEEN KICKED, HIT, SLAPPED, OR OTHERWISE PHYSICALLY HURT BY YOUR PARTNER OR EX-PARTNER?: NO

## 2023-10-20 SDOH — HEALTH STABILITY: MENTAL HEALTH: HOW OFTEN DO YOU HAVE 6 OR MORE DRINKS ON ONE OCCASION?: NEVER

## 2023-10-20 SDOH — SOCIAL STABILITY: SOCIAL NETWORK: IN A TYPICAL WEEK, HOW MANY TIMES DO YOU TALK ON THE PHONE WITH FAMILY, FRIENDS, OR NEIGHBORS?: NEVER

## 2023-10-20 SDOH — SOCIAL STABILITY: SOCIAL INSECURITY: WITHIN THE LAST YEAR, HAVE YOU BEEN HUMILIATED OR EMOTIONALLY ABUSED IN OTHER WAYS BY YOUR PARTNER OR EX-PARTNER?: NO

## 2023-10-20 SDOH — ECONOMIC STABILITY: INCOME INSECURITY: HOW HARD IS IT FOR YOU TO PAY FOR THE VERY BASICS LIKE FOOD, HOUSING, MEDICAL CARE, AND HEATING?: NOT VERY HARD

## 2023-10-20 SDOH — ECONOMIC STABILITY: HOUSING INSECURITY
IN THE LAST 12 MONTHS, WAS THERE A TIME WHEN YOU DID NOT HAVE A STEADY PLACE TO SLEEP OR SLEPT IN A SHELTER (INCLUDING NOW)?: NO

## 2023-10-20 SDOH — SOCIAL STABILITY: SOCIAL NETWORK: ARE YOU MARRIED, WIDOWED, DIVORCED, SEPARATED, NEVER MARRIED, OR LIVING WITH A PARTNER?: MARRIED

## 2023-10-20 SDOH — SOCIAL STABILITY: SOCIAL INSECURITY: DO YOU FEEL UNSAFE GOING BACK TO THE PLACE WHERE YOU ARE LIVING?: NO

## 2023-10-20 SDOH — SOCIAL STABILITY: SOCIAL INSECURITY: WITHIN THE LAST YEAR, HAVE YOU BEEN AFRAID OF YOUR PARTNER OR EX-PARTNER?: NO

## 2023-10-20 SDOH — HEALTH STABILITY: MENTAL HEALTH
STRESS IS WHEN SOMEONE FEELS TENSE, NERVOUS, ANXIOUS, OR CAN'T SLEEP AT NIGHT BECAUSE THEIR MIND IS TROUBLED. HOW STRESSED ARE YOU?: TO SOME EXTENT

## 2023-10-20 SDOH — SOCIAL STABILITY: SOCIAL NETWORK: HOW OFTEN DO YOU GET TOGETHER WITH FRIENDS OR RELATIVES?: NEVER

## 2023-10-20 SDOH — SOCIAL STABILITY: SOCIAL INSECURITY: DO YOU FEEL ANYONE HAS EXPLOITED OR TAKEN ADVANTAGE OF YOU FINANCIALLY OR OF YOUR PERSONAL PROPERTY?: NO

## 2023-10-20 SDOH — HEALTH STABILITY: MENTAL HEALTH: HOW OFTEN DO YOU HAVE A DRINK CONTAINING ALCOHOL?: NEVER

## 2023-10-20 SDOH — ECONOMIC STABILITY: TRANSPORTATION INSECURITY
IN THE PAST 12 MONTHS, HAS THE LACK OF TRANSPORTATION KEPT YOU FROM MEDICAL APPOINTMENTS OR FROM GETTING MEDICATIONS?: NO

## 2023-10-20 SDOH — SOCIAL STABILITY: SOCIAL INSECURITY: DOES ANYONE TRY TO KEEP YOU FROM HAVING/CONTACTING OTHER FRIENDS OR DOING THINGS OUTSIDE YOUR HOME?: NO

## 2023-10-20 SDOH — ECONOMIC STABILITY: FOOD INSECURITY: WITHIN THE PAST 12 MONTHS, YOU WORRIED THAT YOUR FOOD WOULD RUN OUT BEFORE YOU GOT MONEY TO BUY MORE.: NEVER TRUE

## 2023-10-20 SDOH — HEALTH STABILITY: MENTAL HEALTH: EXPERIENCED ANY OF THE FOLLOWING LIFE EVENTS: DEATH OF FAMILY/FRIEND

## 2023-10-20 SDOH — SOCIAL STABILITY: SOCIAL INSECURITY
WITHIN THE LAST YEAR, HAVE TO BEEN RAPED OR FORCED TO HAVE ANY KIND OF SEXUAL ACTIVITY BY YOUR PARTNER OR EX-PARTNER?: NO

## 2023-10-20 SDOH — SOCIAL STABILITY: SOCIAL NETWORK: HOW OFTEN DO YOU ATTENT MEETINGS OF THE CLUB OR ORGANIZATION YOU BELONG TO?: NEVER

## 2023-10-20 SDOH — SOCIAL STABILITY: SOCIAL INSECURITY: ARE YOU OR HAVE YOU BEEN THREATENED OR ABUSED PHYSICALLY, EMOTIONALLY, OR SEXUALLY BY ANYONE?: NO

## 2023-10-20 SDOH — SOCIAL STABILITY: SOCIAL INSECURITY: WERE YOU ABLE TO COMPLETE ALL THE BEHAVIORAL HEALTH SCREENINGS?: YES

## 2023-10-20 SDOH — SOCIAL STABILITY: SOCIAL INSECURITY: HAS ANYONE EVER THREATENED TO HURT YOUR FAMILY OR YOUR PETS?: NO

## 2023-10-20 SDOH — SOCIAL STABILITY: SOCIAL INSECURITY: ABUSE: ADULT

## 2023-10-20 SDOH — SOCIAL STABILITY: SOCIAL INSECURITY: POSSIBLE ABUSE REPORTED TO:: ADVOCATE

## 2023-10-20 SDOH — SOCIAL STABILITY: SOCIAL INSECURITY: HAVE YOU HAD THOUGHTS OF HARMING ANYONE ELSE?: YES

## 2023-10-20 SDOH — HEALTH STABILITY: MENTAL HEALTH: CURRENT SMOKER: 0

## 2023-10-20 SDOH — HEALTH STABILITY: MENTAL HEALTH: HOW MANY STANDARD DRINKS CONTAINING ALCOHOL DO YOU HAVE ON A TYPICAL DAY?: PATIENT DOES NOT DRINK

## 2023-10-20 ASSESSMENT — ACTIVITIES OF DAILY LIVING (ADL)
FEEDING YOURSELF: INDEPENDENT
TOILETING: INDEPENDENT
HEARING - LEFT EAR: FUNCTIONAL
DRESSING YOURSELF: INDEPENDENT
PATIENT'S MEMORY ADEQUATE TO SAFELY COMPLETE DAILY ACTIVITIES?: YES
HEARING - RIGHT EAR: FUNCTIONAL
JUDGMENT_ADEQUATE_SAFELY_COMPLETE_DAILY_ACTIVITIES: YES
ADEQUATE_TO_COMPLETE_ADL: YES
BATHING: INDEPENDENT
LACK_OF_TRANSPORTATION: NO
WALKS IN HOME: INDEPENDENT
GROOMING: INDEPENDENT

## 2023-10-20 ASSESSMENT — COGNITIVE AND FUNCTIONAL STATUS - GENERAL
MOVING FROM LYING ON BACK TO SITTING ON SIDE OF FLAT BED WITH BEDRAILS: A LITTLE
MOBILITY SCORE: 18
STANDING UP FROM CHAIR USING ARMS: A LITTLE
TURNING FROM BACK TO SIDE WHILE IN FLAT BAD: A LITTLE
HELP NEEDED FOR BATHING: A LITTLE
DAILY ACTIVITIY SCORE: 20
TOILETING: A LITTLE
DRESSING REGULAR UPPER BODY CLOTHING: A LITTLE
DRESSING REGULAR LOWER BODY CLOTHING: A LITTLE
MOVING TO AND FROM BED TO CHAIR: A LITTLE
CLIMB 3 TO 5 STEPS WITH RAILING: A LOT

## 2023-10-20 ASSESSMENT — LIFESTYLE VARIABLES
AUDIT-C TOTAL SCORE: 0
SUBSTANCE_ABUSE_PAST_12_MONTHS: NO
SKIP TO QUESTIONS 9-10: 1
AUDIT-C TOTAL SCORE: 0
AUDIT-C TOTAL SCORE: 0
SKIP TO QUESTIONS 9-10: 1
HOW MANY STANDARD DRINKS CONTAINING ALCOHOL DO YOU HAVE ON A TYPICAL DAY: PATIENT DOES NOT DRINK
PRESCIPTION_ABUSE_PAST_12_MONTHS: NO
HOW OFTEN DO YOU HAVE 6 OR MORE DRINKS ON ONE OCCASION: NEVER
HOW OFTEN DO YOU HAVE A DRINK CONTAINING ALCOHOL: NEVER

## 2023-10-20 ASSESSMENT — PAIN SCALES - GENERAL
PAINLEVEL_OUTOF10: 0 - NO PAIN

## 2023-10-20 ASSESSMENT — PAIN - FUNCTIONAL ASSESSMENT
PAIN_FUNCTIONAL_ASSESSMENT: UNABLE TO SELF-REPORT
PAIN_FUNCTIONAL_ASSESSMENT: 0-10
PAIN_FUNCTIONAL_ASSESSMENT: 0-10

## 2023-10-20 ASSESSMENT — PATIENT HEALTH QUESTIONNAIRE - PHQ9
1. LITTLE INTEREST OR PLEASURE IN DOING THINGS: NOT AT ALL
SUM OF ALL RESPONSES TO PHQ9 QUESTIONS 1 & 2: 0
2. FEELING DOWN, DEPRESSED OR HOPELESS: NOT AT ALL

## 2023-10-20 NOTE — ANESTHESIA PROCEDURE NOTES
Airway  Date/Time: 10/20/2023 8:52 AM  Urgency: elective    Airway not difficult    Staffing  Performed: CRNA   Authorized by: ADI Perez    Performed by: ADI Perez  Patient location during procedure: OR    Indications and Patient Condition  Indications for airway management: anesthesia  Sedation level: deep  Preoxygenated: yes  Patient position: sniffing  Mask difficulty assessment: 1 - vent by mask  Planned trial extubation    Final Airway Details  Final airway type: endotracheal airway      Successful airway: ETT  Cuffed: yes   Successful intubation technique: video laryngoscopy (Eddy)  Facilitating devices/methods: intubating stylet  Endotracheal tube insertion site: oral  Blade size: #3  ETT size (mm): 7.0  Placement verified by: capnometry   Cuff volume (mL): 7  Measured from: lips  ETT to lips (cm): 21  Number of attempts at approach: 1    Additional Comments  Teeth and lips in pre-anesthetic condition

## 2023-10-20 NOTE — ANESTHESIA POSTPROCEDURE EVALUATION
Patient: Aurelia Thayer    Procedure Summary       Date: 10/20/23 Room / Location: [facility] OR 07 / Virtual [facility] OR    Anesthesia Start: 0841 Anesthesia Stop: 1510    Procedure: OPEN RIGHT COLON RESECTION (Right) Diagnosis:     Surgeons: Sushma Hendricks MD Responsible Provider: ADI Perez    Anesthesia Type: general ASA Status: 3            Anesthesia Type: general    Vitals Value Taken Time   BP  10/20/23 1523   Temp  10/20/23 1523   Pulse 92 10/20/23 1522   Resp  10/20/23 1523   SpO2 97 % 10/20/23 1522   Vitals shown include unvalidated device data.    Anesthesia Post Evaluation    Patient location during evaluation: PACU  Patient participation: complete - patient participated  Level of consciousness: awake  Pain management: adequate  Multimodal analgesia pain management approach  Airway patency: patent  Two or more strategies used to mitigate risk of obstructive sleep apnea  Cardiovascular status: acceptable  Respiratory status: acceptable  Hydration status: acceptable        No notable events documented.

## 2023-10-20 NOTE — NURSING NOTE
Patient arrived to 1 W from PACU in bed VSS patient sleepy but arousable mid line abd incision DCI.  YUE deraining a small ammount of Serous fluid

## 2023-10-20 NOTE — OP NOTE
OPEN RIGHT COLON RESECTION (R) Operative Note     Date: 10/20/2023  OR Location: [facility] OR    Name: Aurelia Thayer, : 1950, Age: 73 y.o., MRN: [id number], Sex: female    Diagnosis  Proximal ascending colon large polyp       Procedures    * OPEN RIGHT COLON RESECTION  Extensive lysis of adhesions   TAP block, bilateral     Surgeons      * Sushma Hendricks - Primary    Resident/Fellow/Other Assistant:  SA     Procedure Summary  Anesthesia: Consult  ASA: III  Anesthesia Staff: CRNA: ALEXANDRA Perez-CRNA  Estimated Blood Loss: 75 mL  Intra-op Medications: * Intraprocedure medication information is unavailable because the case start and end events have not been set *           Anesthesia Record               Intraprocedure I/O Totals          Intake    LR 1700.00 mL    Total Intake 1700 mL       Output    Urine 175 mL    Est. Blood Loss 75 mL    Total Output 250 mL       Net    Net Volume 1450 mL          Specimen:   ID Type Source Tests Collected by Time   1 : 1. RIGHT COLON Tissue COLON - RIGHT HEMICOLECTOMY SURGICAL PATHOLOGY EXAM Sushma Hendricks MD 10/20/2023 1328        Staff:   Circulator: Evette Strong RN; Eileen Good RN  Relief Scrub: Lianna Childs  Scrub Person: Janneth Galvin; Brissa Guerra RN         Drains and/or Catheters:   Closed/Suction Drain Inferior Abdomen 19 Fr. (Active)   Dressing Status Clean;Dry 10/20/23 1517   Drainage Appearance Bright red 10/20/23 1517   Status To bulb suction 10/20/23 1359       [REMOVED] Urethral Catheter Double-lumen 16 Fr. (Removed)   Site Assessment Clean;Skin intact 10/20/23 0921   Collection Container Standard drainage bag 10/20/23 0921          Findings: see below     Indications: Aurelia Thayer is an 73 y.o. female who is having surgery for a large proximal ascending colon polyps which was not amenable to endoscopic resection per GI Dr Frederick. She was scheduled to have surgical resection today. Please see me note from .     The patient was seen in  the preoperative area. The risks, benefits, complications, treatment options, non-operative alternatives, expected recovery and outcomes were discussed with the patient. The possibilities of reaction to medication, pulmonary aspiration, injury to surrounding structures, bleeding, recurrent infection, the need for additional procedures, failure to diagnose a condition, and creating a complication requiring transfusion or operation were discussed with the patient. The patient concurred with the proposed plan, giving informed consent.  The site of surgery was properly noted/marked if necessary per policy. The patient has been actively warmed in preoperative area. Preoperative antibiotics have been ordered and given within 1 hours of incision. Venous thrombosis prophylaxis have been ordered including bilateral sequential compression devices and chemical prophylaxis    Procedure Details:   After general anesthesia, her abdomen was prepped and draped. A midline incision was made at previous midline scar. It was carried down through the subcutaneous tissue and fascia. The abdomen was entered with no problem. However, she has very dense adhesions. We performed very extensive lysis of adhesions, which took us about two and half hours. We eventually free small bowel loops from the pelvis, released omental adhesions and freed small bowel from cecum all the way to proximal jejunum. There were significant dese scar tissues on the left side of abdomen. I was not sure if we freed small bowel all the way to Ligament of Treitz. The small bowel was not dilated and clinically patient was having no obstruction symptoms. We decided to stop further adhesiolysis which may cause injury and harm the patient. During the extensive lysis of adhesions, we made a couple of serosa injury which were repaired by 3-0 Vicryl Lembert stitches. The injury was unavoidable and should have no clinical significance. We then placed Lowell Anders for exposure.   We then mobilized the cecum and right colon off the lateral abdominal wall. The hepatic flexure was completely mobilized. This patient has redundant transverse colon. We did not need to do much dissection for free transverse colon for anastomosis. Care was taken not to injury the duodenum. Patient had previous cholecystectomy. We did have to free some omental adhesions from the gallbladder fossa. Tattoo ink was noted at the proximal ascending colon.  The terminal ileum mobilized and transected at about 10cm from the ICV with KODY stapler with blue load. The colon was transected with the same stapler at the transverse colon at least 10cm from the tattoo site. The corresponding mesentery was then transected with Ligasure. The ileocolic vessels was tied with #0 Vicryl and divided with Ligasure.  The specimen was then passed off the field. We then aligned the ends of distal ileum with colon end. A side to side functional end to end ileocolonic anastomosis was then fashioned with KODY stapler with blue load. The common channel was closed with TA stapler with blue load. All dirty instruments, surgical towel and gloves were changed. The anastomosis crotch was protected with two Lembert stitches with 3-0 vicryl. The staple line was reinforced with interrupted 3-0 vicryl. The corners were buried with 3-0 vicryl. The mesentery defect was closed with running 3-0 Vicryl. Anastomosis was checked and was widely patent. Residual blood and fluids in the abdomen was then suctioned out. A 19 Fr channeled drain was then placed in the pelvis with the tip at the Kyle pouch. The exit site was in the RLQ.  TAP block was then performed under direct visualization with 1% Lidocaine and 0.5% Marcaine 1:1mixture. The fascia was then closed with looped #0 PDS. The subcutaneous tissue was irrigated and closed with 3-0 Vicryl. The skin was closed with staples. Dressing was then applied.     All counts were correct at the conclusion of surgery.      Significantly more time was spent for the procedure due to above mentioned dense adhesions, obliterated tissue plane and distorted anatomy.     Complications:  None; patient tolerated the procedure well.    Disposition: PACU - hemodynamically stable.  Condition: stable           Attending Attestation: I was present and scrubbed for the entire procedure.    Sushma Hendricks  Phone Number: [phone]

## 2023-10-20 NOTE — ANESTHESIA PREPROCEDURE EVALUATION
Patient: Aurelia Thayer    Procedure Information       Date/Time: 10/20/23 0830    Procedure: OPEN RIGHT COLON RESECTION (Right)    Location: [facility] OR 07 / [facility]    Surgeons: Sushma Hendricks MD            Relevant Problems   Anesthesia (within normal limits)      Cardiovascular (within normal limits)   (-) Chest pain   (-) HTN (hypertension)      Endocrine (within normal limits)      GI   (+) Adenocarcinoma, colon (CMS/HCC)   (-) Gastroesophageal reflux disease      /Renal (within normal limits)      Neuro/Psych   (+) Peripheral neuropathy   (-) CVA (cerebral vascular accident) (CMS/HCC)   (-) Chronic headaches   (-) Seizures (CMS/HCC)      Pulmonary  Covid 8/2022   (-) Asthma   (-) Recent URI      GI/Hepatic   (+) Adenocarcinoma, colon (CMS/HCC)      Hematology (within normal limits)      Eyes, Ears, Nose, and Throat   (+) Vision loss     Vitals:    10/20/23 0708   BP: 129/62   Pulse: 96   Resp: 16   Temp: 36.2 °C (97.2 °F)   SpO2: 99%       Past Surgical History:   Procedure Laterality Date    APPENDECTOMY      CHOLECYSTECTOMY      CT GUIDED PERCUTANEOUS PERITONEAL OR RETROPERITONEAL FLUID COLLECTION DRAINAGE  11/09/2021    CT GUIDED PERCUTANEOUS PERITONEAL OR RETROPERITONEAL FLUID COLLECTION DRAINAGE 11/9/2021 [facility] CLINICAL LEGACY    ECTOPIC PREGNANCY SURGERY      MEDIPORT      ORIF HIP FRACTURE      left 8/22    ORIF RADIAL SHAFT FRACTURE      OTHER SURGICAL HISTORY  12/02/2021    Colectomy subtotal    OTHER SURGICAL HISTORY  12/02/2021    Colostomy     Past Medical History:   Diagnosis Date    Colon cancer (CMS/HCC)     Peripheral neuropathy        Current Facility-Administered Medications:     ciprofloxacin (Cipro) IVPB 400 mg, 400 mg, intravenous, Once, Sushma Hendricks MD    lactated Ringer's infusion, 100 mL/hr, intravenous, Continuous, Fly Ying MD    metroNIDAZOLE in NaCl (iso-os) (Flagyl)  mg, 500 mg, intravenous, Once, Sushma Hendricks MD  Prior to Admission medications    Medication Sig Start  Date End Date Taking? Authorizing Provider   acetaminophen (Tylenol) 500 mg tablet Take 2 tablets (1,000 mg) by mouth every 6 hours if needed. 11/12/21  Yes Historical Provider, MD   chlorhexidine (Peridex) 0.12 % solution Use 15 mL in the mouth or throat once daily. Swish and spit one capful the night before surgery and morning  of surgery 10/13/23 1/11/24 Yes [facility]   cyanocobalamin (Vitamin B-12) 250 mcg tablet Take 1 tablet (250 mcg) by mouth once daily.   Yes Historical Provider, MD   ECHINACEA ORAL Take 1 tablet by mouth every other day.   Yes Historical Provider, MD   ferrous sulfate 325 (65 Fe) MG EC tablet Take 65 mg by mouth once daily with a meal. Do not crush, chew, or split.   Yes Historical Provider, MD     Allergies   Allergen Reactions    Penicillins Anaphylaxis     Social History     Tobacco Use    Smoking status: Never    Smokeless tobacco: Never   Substance Use Topics    Alcohol use: Yes     Comment: 1 time a month         Chemistry    Lab Results   Component Value Date/Time     10/13/2023 1050    K 5.0 10/13/2023 1050     10/13/2023 1050    CO2 28 10/13/2023 1050    BUN 19 10/13/2023 1050    CREATININE 0.99 10/13/2023 1050    Lab Results   Component Value Date/Time    CALCIUM 9.1 10/13/2023 1050    ALKPHOS 83 10/13/2023 1050    AST 19 10/13/2023 1050    ALT 14 10/13/2023 1050    BILITOT 0.4 10/13/2023 1050          Lab Results   Component Value Date/Time    WBC 6.3 10/13/2023 1050    HGB 11.6 (L) 10/13/2023 1050    HCT 37.4 10/13/2023 1050     10/13/2023 1050     Lab Results   Component Value Date/Time    PROTIME 11.6 10/13/2023 1050    INR 1.0 10/13/2023 1050       NPO Detail:  NPO/Void Status  Carbonhydrate Drink Given Prior to Surgery? : N  Date of Last Liquid: 10/20/23  Time of Last Liquid: 0200  Date of Last Solid: 10/18/23  Time of Last Solid: 2000  Last Intake Type: Clear fluids    Bowel prep     Physical Exam    Airway  Mallampati: III  Neck ROM:  full     Cardiovascular - normal exam  Rate: normal     Dental   Comments: Partial upper and lower; #7 broken/filling lost   Pulmonary - normal exam     Abdominal            Anesthesia Plan    ASA 3     general     The patient is not a current smoker.    intravenous induction   Postoperative administration of opioids is intended.  Trial extubation is planned.  Anesthetic plan and risks discussed with patient and spouse.  Use of blood products discussed with patient and spouse who.    Plan discussed with CRNA.

## 2023-10-21 LAB
ANION GAP SERPL CALC-SCNC: 11 MMOL/L (ref 10–20)
BUN SERPL-MCNC: 16 MG/DL (ref 6–23)
CALCIUM SERPL-MCNC: 8 MG/DL (ref 8.6–10.3)
CHLORIDE SERPL-SCNC: 106 MMOL/L (ref 98–107)
CO2 SERPL-SCNC: 24 MMOL/L (ref 21–32)
CREAT SERPL-MCNC: 1.09 MG/DL (ref 0.5–1.05)
ERYTHROCYTE [DISTWIDTH] IN BLOOD BY AUTOMATED COUNT: 14.1 % (ref 11.5–14.5)
GFR SERPL CREATININE-BSD FRML MDRD: 53 ML/MIN/1.73M*2
GLUCOSE SERPL-MCNC: 129 MG/DL (ref 74–99)
HCT VFR BLD AUTO: 32.5 % (ref 36–46)
HGB BLD-MCNC: 10 G/DL (ref 12–16)
MAGNESIUM SERPL-MCNC: 1.53 MG/DL (ref 1.6–2.4)
MCH RBC QN AUTO: 27.3 PG (ref 26–34)
MCHC RBC AUTO-ENTMCNC: 30.8 G/DL (ref 32–36)
MCV RBC AUTO: 89 FL (ref 80–100)
NRBC BLD-RTO: 0 /100 WBCS (ref 0–0)
PLATELET # BLD AUTO: 181 X10*3/UL (ref 150–450)
PMV BLD AUTO: 10.3 FL (ref 7.5–11.5)
POTASSIUM SERPL-SCNC: 4.2 MMOL/L (ref 3.5–5.3)
RBC # BLD AUTO: 3.66 X10*6/UL (ref 4–5.2)
SODIUM SERPL-SCNC: 137 MMOL/L (ref 136–145)
WBC # BLD AUTO: 8.5 X10*3/UL (ref 4.4–11.3)

## 2023-10-21 PROCEDURE — 2500000001 HC RX 250 WO HCPCS SELF ADMINISTERED DRUGS (ALT 637 FOR MEDICARE OP): Performed by: SURGERY

## 2023-10-21 PROCEDURE — 80048 BASIC METABOLIC PNL TOTAL CA: CPT | Performed by: SURGERY

## 2023-10-21 PROCEDURE — 36415 COLL VENOUS BLD VENIPUNCTURE: CPT | Performed by: SURGERY

## 2023-10-21 PROCEDURE — 1100000001 HC PRIVATE ROOM DAILY

## 2023-10-21 PROCEDURE — 96372 THER/PROPH/DIAG INJ SC/IM: CPT | Performed by: SURGERY

## 2023-10-21 PROCEDURE — 2500000004 HC RX 250 GENERAL PHARMACY W/ HCPCS (ALT 636 FOR OP/ED): Performed by: SURGERY

## 2023-10-21 PROCEDURE — 83735 ASSAY OF MAGNESIUM: CPT | Performed by: SURGERY

## 2023-10-21 PROCEDURE — 85027 COMPLETE CBC AUTOMATED: CPT | Performed by: SURGERY

## 2023-10-21 RX ADMIN — ENOXAPARIN SODIUM 40 MG: 40 INJECTION SUBCUTANEOUS at 15:52

## 2023-10-21 RX ADMIN — METRONIDAZOLE 500 MG: 500 INJECTION, SOLUTION INTRAVENOUS at 00:14

## 2023-10-21 RX ADMIN — CIPROFLOXACIN 400 MG: 2 INJECTION, SOLUTION INTRAVENOUS at 05:50

## 2023-10-21 RX ADMIN — DOCUSATE SODIUM 100 MG: 100 CAPSULE, LIQUID FILLED ORAL at 20:26

## 2023-10-21 RX ADMIN — CYCLOBENZAPRINE HYDROCHLORIDE 5 MG: 5 TABLET, FILM COATED ORAL at 08:26

## 2023-10-21 RX ADMIN — DOCUSATE SODIUM 100 MG: 100 CAPSULE, LIQUID FILLED ORAL at 08:26

## 2023-10-21 RX ADMIN — OXYCODONE HYDROCHLORIDE 5 MG: 5 TABLET ORAL at 08:33

## 2023-10-21 RX ADMIN — CYCLOBENZAPRINE HYDROCHLORIDE 5 MG: 5 TABLET, FILM COATED ORAL at 15:52

## 2023-10-21 RX ADMIN — METRONIDAZOLE 500 MG: 500 INJECTION, SOLUTION INTRAVENOUS at 08:26

## 2023-10-21 RX ADMIN — SODIUM CHLORIDE 500 ML: 9 INJECTION, SOLUTION INTRAVENOUS at 05:59

## 2023-10-21 RX ADMIN — CYCLOBENZAPRINE HYDROCHLORIDE 5 MG: 5 TABLET, FILM COATED ORAL at 20:26

## 2023-10-21 RX ADMIN — SODIUM CHLORIDE 75 ML/HR: 9 INJECTION, SOLUTION INTRAVENOUS at 00:14

## 2023-10-21 RX ADMIN — OXYCODONE HYDROCHLORIDE 5 MG: 5 TABLET ORAL at 17:30

## 2023-10-21 RX ADMIN — GABAPENTIN 100 MG: 100 CAPSULE ORAL at 08:26

## 2023-10-21 RX ADMIN — GABAPENTIN 100 MG: 100 CAPSULE ORAL at 20:27

## 2023-10-21 ASSESSMENT — PAIN SCALES - GENERAL
PAINLEVEL_OUTOF10: 2
PAINLEVEL_OUTOF10: 4
PAINLEVEL_OUTOF10: 6
PAINLEVEL_OUTOF10: 2
PAINLEVEL_OUTOF10: 2
PAINLEVEL_OUTOF10: 0 - NO PAIN

## 2023-10-21 ASSESSMENT — COGNITIVE AND FUNCTIONAL STATUS - GENERAL
DRESSING REGULAR LOWER BODY CLOTHING: A LITTLE
WALKING IN HOSPITAL ROOM: A LITTLE
CLIMB 3 TO 5 STEPS WITH RAILING: A LITTLE
TURNING FROM BACK TO SIDE WHILE IN FLAT BAD: A LITTLE
DAILY ACTIVITIY SCORE: 23
MOVING TO AND FROM BED TO CHAIR: A LITTLE
WALKING IN HOSPITAL ROOM: A LITTLE
DRESSING REGULAR LOWER BODY CLOTHING: A LITTLE
DAILY ACTIVITIY SCORE: 23
MOVING TO AND FROM BED TO CHAIR: A LITTLE
CLIMB 3 TO 5 STEPS WITH RAILING: A LITTLE
TURNING FROM BACK TO SIDE WHILE IN FLAT BAD: A LITTLE
STANDING UP FROM CHAIR USING ARMS: A LITTLE
MOBILITY SCORE: 19
STANDING UP FROM CHAIR USING ARMS: A LITTLE
MOBILITY SCORE: 19

## 2023-10-21 ASSESSMENT — PAIN DESCRIPTION - DESCRIPTORS: DESCRIPTORS: ACHING

## 2023-10-21 ASSESSMENT — PAIN - FUNCTIONAL ASSESSMENT
PAIN_FUNCTIONAL_ASSESSMENT: 0-10
PAIN_FUNCTIONAL_ASSESSMENT: 0-10

## 2023-10-21 NOTE — PROGRESS NOTES
10/21/23 1051   Discharge Planning   Living Arrangements Spouse/significant other   Support Systems Spouse/significant other   Assistance Needed patient is alert and oriented x3, indpendent in ADL's, has a walker and cane if needed and drives   Type of Residence Private residence   Number of Stairs to Enter Residence 3   Home or Post Acute Services None   Patient expects to be discharged to: Home no needs   Does the patient need discharge transport arranged? No

## 2023-10-21 NOTE — CARE PLAN
The patient's goals for the shift include  pain control    The clinical goals for the shift include  pain control    Over the shift, the patient did not make progress toward the following goals. Barriers to progression include pain. Recommendations to address these barriers include pain meds.

## 2023-10-21 NOTE — CARE PLAN
Problem: Fall/Injury  Goal: Not fall by end of shift  Outcome: Progressing  Goal: Be free from injury by end of the shift  Outcome: Progressing  Goal: Verbalize understanding of personal risk factors for fall in the hospital  Outcome: Progressing  Goal: Verbalize understanding of risk factor reduction measures to prevent injury from fall in the home  Outcome: Progressing  Goal: Use assistive devices by end of the shift  Outcome: Progressing  Goal: Pace activities to prevent fatigue by end of the shift  Outcome: Progressing     Problem: Safety  Goal: Patient will be injury free during hospitalization  Outcome: Progressing  Goal: I will remain free of falls  Outcome: Progressing     Problem: Daily Care  Goal: Daily care needs are met  Outcome: Progressing     Problem: Psychosocial Needs  Goal: Demonstrates ability to cope with hospitalization/illness  Outcome: Progressing  Goal: Collaborate with me, my family, and caregiver to identify my specific goals  Outcome: Progressing     Problem: Discharge Barriers  Goal: My discharge needs are met  Outcome: Progressing     Problem: Deep Vein Thrombosis  Goal: I will remain free from complications of deep vein thrombosis and maintain current level of mobility  Outcome: Progressing     Problem: Meds/Post-op Pain  Goal: Pain controlled to tolerate pain level  Outcome: Progressing  Goal: Tolerates prescribed medication  Outcome: Progressing     Problem: DVT/VTE Prevention/Activity  Goal: No decrease in circulation/sensation  Outcome: Progressing  Goal: Prevent skin breakdown  Outcome: Progressing  Goal: Return to preop oxygenation status  Outcome: Progressing  Goal: Tolerates optimal activity  Outcome: Progressing  Goal: Increase self care and/or family involvement in 24 hrs.  Outcome: Progressing     Problem: Wound care/infection prevention  Goal: No signs of infection in 24 hrs.  Outcome: Progressing  Goal: No unexpected bleeding from incision this shift  Outcome:  Progressing     Problem: Diet/fluid balance  Goal: Adequate urinary output  Outcome: Progressing  Goal: Free from nausea/vomiting  Outcome: Progressing  Goal: Return in bowel function  Outcome: Progressing  Goal: Tolerates prescribed diet  Outcome: Progressing     Problem: Other goals  Goal: No change in neurological status  Outcome: Progressing  Goal: Stabilize vital signs (return to 10% of baseline)  Outcome: Progressing   The patient's goals for the shift include      The clinical goals for the shift include Tolerate diet    Over the shift, the patient did make progress toward the following goals. Barriers to progression include none. Recommendations to address these barriers include none.

## 2023-10-21 NOTE — CARE PLAN
The patient's goals for the shift include      Problem: Fall/Injury  Goal: Not fall by end of shift  Outcome: Progressing     Problem: Daily Care  Goal: Daily care needs are met  Outcome: Progressing     Problem: DVT/VTE Prevention/Activity  Goal: No decrease in circulation/sensation  Outcome: Progressing     Problem: DVT/VTE Prevention/Activity  Goal: Prevent skin breakdown  Outcome: Progressing     Problem: Wound care/infection prevention  Goal: No signs of infection in 24 hrs.  Outcome: Progressing       The clinical goals for the shift include pain control    Over the shift, the patient was lethargic, but did get up and ambulate with assist. Pain under control.

## 2023-10-21 NOTE — PROGRESS NOTES
"Aurelia Thayer is a 73 y.o. female POD1, s/p open right colon resection, extensive LENNY for large ascending polyp     Subjective   Low UOP, got 500cc NS bolus   Pain ok   Had good sleep   No bowel function     Objective     Physical Exam  Constitutional:       General: She is not in acute distress.  Pulmonary:      Effort: Pulmonary effort is normal.   Abdominal:      Palpations: Abdomen is soft.      Comments: Dressing clean   Musculoskeletal:      Cervical back: Neck supple.   Psychiatric:         Mood and Affect: Mood normal.         Last Recorded Vitals  Blood pressure 143/58, pulse 88, temperature 36 °C (96.8 °F), temperature source Temporal, resp. rate 16, height 1.676 m (5' 6\"), weight 83 kg (182 lb 15.7 oz), SpO2 95 %.  Intake/Output last 3 Shifts:  I/O last 3 completed shifts:  In: 4393.8 (52.9 mL/kg) [P.O.:310; I.V.:1983.8 (23.9 mL/kg); IV Piggyback:2100]  Out: 285 (3.4 mL/kg) [Urine:175 (0.1 mL/kg/hr); Drains:35; Blood:75]  Weight: 83 kg     Relevant Results  Scheduled medications  bisacodyl, 10 mg, rectal, Daily  cyclobenzaprine, 5 mg, oral, TID  docusate sodium, 100 mg, oral, BID  enoxaparin, 40 mg, subcutaneous, q24h  gabapentin, 100 mg, oral, BID      Continuous medications  sodium chloride 0.9%, 75 mL/hr, Last Rate: 75 mL/hr (10/21/23 0548)      PRN medications  PRN medications: morphine, naloxone, ondansetron **OR** ondansetron, oxyCODONE, oxyCODONE      Results for orders placed or performed during the hospital encounter of 10/20/23 (from the past 24 hour(s))   CBC   Result Value Ref Range    WBC 8.5 4.4 - 11.3 x10*3/uL    nRBC 0.0 0.0 - 0.0 /100 WBCs    RBC 3.66 (L) 4.00 - 5.20 x10*6/uL    Hemoglobin 10.0 (L) 12.0 - 16.0 g/dL    Hematocrit 32.5 (L) 36.0 - 46.0 %    MCV 89 80 - 100 fL    MCH 27.3 26.0 - 34.0 pg    MCHC 30.8 (L) 32.0 - 36.0 g/dL    RDW 14.1 11.5 - 14.5 %    Platelets 181 150 - 450 x10*3/uL    MPV 10.3 7.5 - 11.5 fL   Basic metabolic panel   Result Value Ref Range    Glucose 129 " (H) 74 - 99 mg/dL    Sodium 137 136 - 145 mmol/L    Potassium 4.2 3.5 - 5.3 mmol/L    Chloride 106 98 - 107 mmol/L    Bicarbonate 24 21 - 32 mmol/L    Anion Gap 11 10 - 20 mmol/L    Urea Nitrogen 16 6 - 23 mg/dL    Creatinine 1.09 (H) 0.50 - 1.05 mg/dL    eGFR 53 (L) >60 mL/min/1.73m*2    Calcium 8.0 (L) 8.6 - 10.3 mg/dL   Magnesium   Result Value Ref Range    Magnesium 1.53 (L) 1.60 - 2.40 mg/dL            Assessment/Plan   POD1, s/p open right colon resection, extensive LENNY for large ascending polyp   Adequate progress   Cr slightly up 1.09    Supportive care   Continue clears for now   Ambulate   Prophylaxis   Monitor UOP          Sushma Hendricks MD

## 2023-10-22 LAB
ANION GAP SERPL CALC-SCNC: 10 MMOL/L (ref 10–20)
BUN SERPL-MCNC: 12 MG/DL (ref 6–23)
CALCIUM SERPL-MCNC: 8 MG/DL (ref 8.6–10.3)
CHLORIDE SERPL-SCNC: 106 MMOL/L (ref 98–107)
CO2 SERPL-SCNC: 23 MMOL/L (ref 21–32)
CREAT SERPL-MCNC: 0.94 MG/DL (ref 0.5–1.05)
ERYTHROCYTE [DISTWIDTH] IN BLOOD BY AUTOMATED COUNT: 14.4 % (ref 11.5–14.5)
GFR SERPL CREATININE-BSD FRML MDRD: 64 ML/MIN/1.73M*2
GLUCOSE SERPL-MCNC: 132 MG/DL (ref 74–99)
HCT VFR BLD AUTO: 33.5 % (ref 36–46)
HGB BLD-MCNC: 10.2 G/DL (ref 12–16)
MCH RBC QN AUTO: 27.3 PG (ref 26–34)
MCHC RBC AUTO-ENTMCNC: 30.4 G/DL (ref 32–36)
MCV RBC AUTO: 90 FL (ref 80–100)
NRBC BLD-RTO: 0 /100 WBCS (ref 0–0)
PLATELET # BLD AUTO: 177 X10*3/UL (ref 150–450)
PMV BLD AUTO: 10.6 FL (ref 7.5–11.5)
POTASSIUM SERPL-SCNC: 3.7 MMOL/L (ref 3.5–5.3)
RBC # BLD AUTO: 3.74 X10*6/UL (ref 4–5.2)
SODIUM SERPL-SCNC: 135 MMOL/L (ref 136–145)
WBC # BLD AUTO: 9.2 X10*3/UL (ref 4.4–11.3)

## 2023-10-22 PROCEDURE — 96372 THER/PROPH/DIAG INJ SC/IM: CPT | Performed by: SURGERY

## 2023-10-22 PROCEDURE — 2500000004 HC RX 250 GENERAL PHARMACY W/ HCPCS (ALT 636 FOR OP/ED): Performed by: SURGERY

## 2023-10-22 PROCEDURE — 80048 BASIC METABOLIC PNL TOTAL CA: CPT | Performed by: SURGERY

## 2023-10-22 PROCEDURE — 85027 COMPLETE CBC AUTOMATED: CPT | Performed by: SURGERY

## 2023-10-22 PROCEDURE — 1100000001 HC PRIVATE ROOM DAILY

## 2023-10-22 PROCEDURE — 36415 COLL VENOUS BLD VENIPUNCTURE: CPT | Performed by: SURGERY

## 2023-10-22 PROCEDURE — 2500000001 HC RX 250 WO HCPCS SELF ADMINISTERED DRUGS (ALT 637 FOR MEDICARE OP): Performed by: SURGERY

## 2023-10-22 RX ADMIN — GABAPENTIN 100 MG: 100 CAPSULE ORAL at 09:07

## 2023-10-22 RX ADMIN — DOCUSATE SODIUM 100 MG: 100 CAPSULE, LIQUID FILLED ORAL at 21:03

## 2023-10-22 RX ADMIN — SODIUM CHLORIDE 75 ML/HR: 9 INJECTION, SOLUTION INTRAVENOUS at 09:08

## 2023-10-22 RX ADMIN — CYCLOBENZAPRINE HYDROCHLORIDE 5 MG: 5 TABLET, FILM COATED ORAL at 15:31

## 2023-10-22 RX ADMIN — BISACODYL 10 MG: 10 SUPPOSITORY RECTAL at 15:31

## 2023-10-22 RX ADMIN — SODIUM CHLORIDE 75 ML/HR: 9 INJECTION, SOLUTION INTRAVENOUS at 22:28

## 2023-10-22 RX ADMIN — ENOXAPARIN SODIUM 40 MG: 40 INJECTION SUBCUTANEOUS at 15:41

## 2023-10-22 RX ADMIN — GABAPENTIN 100 MG: 100 CAPSULE ORAL at 21:03

## 2023-10-22 RX ADMIN — CYCLOBENZAPRINE HYDROCHLORIDE 5 MG: 5 TABLET, FILM COATED ORAL at 09:07

## 2023-10-22 RX ADMIN — DOCUSATE SODIUM 100 MG: 100 CAPSULE, LIQUID FILLED ORAL at 09:07

## 2023-10-22 RX ADMIN — CYCLOBENZAPRINE HYDROCHLORIDE 5 MG: 5 TABLET, FILM COATED ORAL at 21:03

## 2023-10-22 ASSESSMENT — COGNITIVE AND FUNCTIONAL STATUS - GENERAL
DRESSING REGULAR UPPER BODY CLOTHING: A LITTLE
MOBILITY SCORE: 18
CLIMB 3 TO 5 STEPS WITH RAILING: A LITTLE
TURNING FROM BACK TO SIDE WHILE IN FLAT BAD: A LITTLE
DAILY ACTIVITIY SCORE: 19
TOILETING: A LITTLE
MOVING FROM LYING ON BACK TO SITTING ON SIDE OF FLAT BED WITH BEDRAILS: A LITTLE
HELP NEEDED FOR BATHING: A LITTLE
MOVING FROM LYING ON BACK TO SITTING ON SIDE OF FLAT BED WITH BEDRAILS: A LITTLE
DRESSING REGULAR LOWER BODY CLOTHING: A LITTLE
DAILY ACTIVITIY SCORE: 20
PERSONAL GROOMING: A LITTLE
MOBILITY SCORE: 18
WALKING IN HOSPITAL ROOM: A LITTLE
TURNING FROM BACK TO SIDE WHILE IN FLAT BAD: A LITTLE
HELP NEEDED FOR BATHING: A LITTLE
TOILETING: A LITTLE
WALKING IN HOSPITAL ROOM: A LITTLE
STANDING UP FROM CHAIR USING ARMS: A LITTLE
STANDING UP FROM CHAIR USING ARMS: A LITTLE
DRESSING REGULAR UPPER BODY CLOTHING: A LITTLE
MOVING TO AND FROM BED TO CHAIR: A LITTLE
CLIMB 3 TO 5 STEPS WITH RAILING: A LITTLE
DRESSING REGULAR LOWER BODY CLOTHING: A LITTLE
MOVING TO AND FROM BED TO CHAIR: A LITTLE

## 2023-10-22 ASSESSMENT — PAIN SCALES - GENERAL
PAINLEVEL_OUTOF10: 6
PAINLEVEL_OUTOF10: 4

## 2023-10-22 ASSESSMENT — PAIN - FUNCTIONAL ASSESSMENT: PAIN_FUNCTIONAL_ASSESSMENT: 0-10

## 2023-10-22 ASSESSMENT — PAIN DESCRIPTION - DESCRIPTORS: DESCRIPTORS: ACHING

## 2023-10-22 NOTE — PROGRESS NOTES
"Aurelia Thayer is a 73 y.o. female POD2, s/p open right colon resection, extensive LENNY for large ascending polyp         Subjective    Pain ok   No bowel function   Drinking water and clear ensure, no N/V        Objective  Physical Exam  Constitutional:       General: She is not in acute distress.  Pulmonary:      Effort: Pulmonary effort is normal.   Abdominal:      Palpations: Abdomen is soft.      Comments: Dressing clean. Drain serosang.   Musculoskeletal:      Cervical back: Neck supple.   Psychiatric:         Mood and Affect: Mood normal.         Last Recorded Vitals  Blood pressure 129/74, pulse 106, temperature 36.1 °C (97 °F), resp. rate 16, height 1.676 m (5' 6\"), weight 83 kg (182 lb 15.7 oz), SpO2 99 %.  Intake/Output last 3 Shifts:  I/O last 3 completed shifts:  In: 2853.8 (34.4 mL/kg) [P.O.:470; I.V.:1983.8 (23.9 mL/kg); IV Piggyback:400]  Out: 820 (9.9 mL/kg) [Urine:775 (0.3 mL/kg/hr); Drains:45]  Weight: 83 kg     Relevant Results  Scheduled medications  bisacodyl, 10 mg, rectal, Daily  cyclobenzaprine, 5 mg, oral, TID  docusate sodium, 100 mg, oral, BID  enoxaparin, 40 mg, subcutaneous, q24h  gabapentin, 100 mg, oral, BID      Continuous medications  sodium chloride 0.9%, 75 mL/hr, Last Rate: 75 mL/hr (10/22/23 0908)      PRN medications  PRN medications: morphine, naloxone, ondansetron **OR** ondansetron, oxyCODONE, oxyCODONE     Results for orders placed or performed during the hospital encounter of 10/20/23 (from the past 24 hour(s))   CBC   Result Value Ref Range    WBC 9.2 4.4 - 11.3 x10*3/uL    nRBC 0.0 0.0 - 0.0 /100 WBCs    RBC 3.74 (L) 4.00 - 5.20 x10*6/uL    Hemoglobin 10.2 (L) 12.0 - 16.0 g/dL    Hematocrit 33.5 (L) 36.0 - 46.0 %    MCV 90 80 - 100 fL    MCH 27.3 26.0 - 34.0 pg    MCHC 30.4 (L) 32.0 - 36.0 g/dL    RDW 14.4 11.5 - 14.5 %    Platelets 177 150 - 450 x10*3/uL    MPV 10.6 7.5 - 11.5 fL   Basic metabolic panel   Result Value Ref Range    Glucose 132 (H) 74 - 99 mg/dL    " Sodium 135 (L) 136 - 145 mmol/L    Potassium 3.7 3.5 - 5.3 mmol/L    Chloride 106 98 - 107 mmol/L    Bicarbonate 23 21 - 32 mmol/L    Anion Gap 10 10 - 20 mmol/L    Urea Nitrogen 12 6 - 23 mg/dL    Creatinine 0.94 0.50 - 1.05 mg/dL    eGFR 64 >60 mL/min/1.73m*2    Calcium 8.0 (L) 8.6 - 10.3 mg/dL      Assessment/Plan   POD2, s/p open right colon resection, extensive LENNY for large ascending polyp   Adequate progress   Cr normalized      Supportive care   Continue clears for now until bowel function returns   Ambulate   Prophylaxis   Suppository            Sushma Hendricks MD

## 2023-10-22 NOTE — CARE PLAN
The patient's goals for the shift include  Pain management / sleep    Problem: Fall/Injury  Goal: Not fall by end of shift  10/21/2023 2339 by Camelia Villatoro RN  Outcome: Progressing    Problem: Psychosocial Needs  Goal: Demonstrates ability to cope with hospitalization/illness  Outcome: Progressing     Problem: Meds/Post-op Pain  Goal: Pain controlled to tolerate pain level  Outcome: Progressing     Problem: Wound care/infection prevention  Goal: No signs of infection in 24 hrs.  Outcome: Progressing       The clinical goals for the shift include Patient will have controlled pain management to be able to sleep at least 5 hours this shift.    Over the shift, the patient did not make progress toward ambulation. Patient stated she will ambulate this morning. Patient was able to manage pain well with repositioning (didn't want medication) which allowed her to sleep more than 5 hours. Patient remained free from falls. Patient seemed to be coping with the hospitalization well. Patients VS remained stable, she is comfortable, and has call light within reach.

## 2023-10-23 PROCEDURE — 2500000004 HC RX 250 GENERAL PHARMACY W/ HCPCS (ALT 636 FOR OP/ED): Performed by: PHYSICIAN ASSISTANT

## 2023-10-23 PROCEDURE — 2500000001 HC RX 250 WO HCPCS SELF ADMINISTERED DRUGS (ALT 637 FOR MEDICARE OP): Performed by: SURGERY

## 2023-10-23 PROCEDURE — 96372 THER/PROPH/DIAG INJ SC/IM: CPT | Performed by: SURGERY

## 2023-10-23 PROCEDURE — 1100000001 HC PRIVATE ROOM DAILY

## 2023-10-23 PROCEDURE — 2500000004 HC RX 250 GENERAL PHARMACY W/ HCPCS (ALT 636 FOR OP/ED): Performed by: SURGERY

## 2023-10-23 RX ORDER — ACETAMINOPHEN 325 MG/1
650 TABLET ORAL EVERY 6 HOURS PRN
Status: DISCONTINUED | OUTPATIENT
Start: 2023-10-23 | End: 2023-10-24 | Stop reason: HOSPADM

## 2023-10-23 RX ORDER — KETOROLAC TROMETHAMINE 15 MG/ML
15 INJECTION, SOLUTION INTRAMUSCULAR; INTRAVENOUS EVERY 6 HOURS
Status: DISCONTINUED | OUTPATIENT
Start: 2023-10-23 | End: 2023-10-24 | Stop reason: HOSPADM

## 2023-10-23 RX ADMIN — CYCLOBENZAPRINE HYDROCHLORIDE 5 MG: 5 TABLET, FILM COATED ORAL at 08:30

## 2023-10-23 RX ADMIN — GABAPENTIN 100 MG: 100 CAPSULE ORAL at 08:30

## 2023-10-23 RX ADMIN — CYCLOBENZAPRINE HYDROCHLORIDE 5 MG: 5 TABLET, FILM COATED ORAL at 15:22

## 2023-10-23 RX ADMIN — CYCLOBENZAPRINE HYDROCHLORIDE 5 MG: 5 TABLET, FILM COATED ORAL at 20:56

## 2023-10-23 RX ADMIN — KETOROLAC TROMETHAMINE 15 MG: 15 INJECTION, SOLUTION INTRAMUSCULAR; INTRAVENOUS at 11:02

## 2023-10-23 RX ADMIN — KETOROLAC TROMETHAMINE 15 MG: 15 INJECTION, SOLUTION INTRAMUSCULAR; INTRAVENOUS at 17:46

## 2023-10-23 RX ADMIN — ACETAMINOPHEN 650 MG: 325 TABLET ORAL at 09:15

## 2023-10-23 RX ADMIN — GABAPENTIN 100 MG: 100 CAPSULE ORAL at 20:57

## 2023-10-23 RX ADMIN — ENOXAPARIN SODIUM 40 MG: 40 INJECTION SUBCUTANEOUS at 15:23

## 2023-10-23 ASSESSMENT — COGNITIVE AND FUNCTIONAL STATUS - GENERAL
DRESSING REGULAR LOWER BODY CLOTHING: A LITTLE
HELP NEEDED FOR BATHING: A LITTLE
WALKING IN HOSPITAL ROOM: A LITTLE
MOBILITY SCORE: 19
STANDING UP FROM CHAIR USING ARMS: A LITTLE
TOILETING: A LITTLE
TURNING FROM BACK TO SIDE WHILE IN FLAT BAD: A LITTLE
DAILY ACTIVITIY SCORE: 20
MOVING TO AND FROM BED TO CHAIR: A LITTLE
DRESSING REGULAR UPPER BODY CLOTHING: A LITTLE
CLIMB 3 TO 5 STEPS WITH RAILING: A LITTLE

## 2023-10-23 ASSESSMENT — ACTIVITIES OF DAILY LIVING (ADL): LACK_OF_TRANSPORTATION: NO

## 2023-10-23 ASSESSMENT — PAIN SCALES - GENERAL
PAINLEVEL_OUTOF10: 0 - NO PAIN

## 2023-10-23 ASSESSMENT — PAIN - FUNCTIONAL ASSESSMENT: PAIN_FUNCTIONAL_ASSESSMENT: 0-10

## 2023-10-23 NOTE — PROGRESS NOTES
10/23/23 1005   Discharge Planning   Living Arrangements Spouse/significant other   Support Systems Spouse/significant other   Assistance Needed Patient is A&O X3, indpendent in ADL's, has a walker and cane if needed and drives   Type of Residence Private residence   Number of Stairs to Enter Residence 3   Who is requesting discharge planning? Provider   Home or Post Acute Services None   Patient expects to be discharged to: Home no needs   Does the patient need discharge transport arranged? No   Financial Resource Strain   How hard is it for you to pay for the very basics like food, housing, medical care, and heating? Not hard   Housing Stability   In the last 12 months, was there a time when you were not able to pay the mortgage or rent on time? N   In the last 12 months, how many places have you lived? 1   In the last 12 months, was there a time when you did not have a steady place to sleep or slept in a shelter (including now)? N   Transportation Needs   In the past 12 months, has lack of transportation kept you from medical appointments or from getting medications? no   In the past 12 months, has lack of transportation kept you from meetings, work, or from getting things needed for daily living? No

## 2023-10-23 NOTE — PROGRESS NOTES
"General/Trauma Surgery Daily Progress Note    Subjective   POD 3 OPEN RIGHT COLON RESECTION, LENNY for large ascending polyp. Doing well, asking for tylenol. Tolerating clears. Had 2 BM. Ambulating.       Objective   Last Recorded Vitals:  Blood pressure 137/58, pulse 98, temperature 36.5 °C (97.7 °F), resp. rate 16, height 1.676 m (5' 6\"), weight 83 kg (182 lb 15.7 oz), SpO2 96 %.    Intake/Output last 3 Shifts:  I/O last 3 completed shifts:  In: 260 (3.1 mL/kg) [P.O.:260]  Out: 2043 (24.6 mL/kg) [Urine:2000 (0.7 mL/kg/hr); Drains:42; Stool:1]  Weight: 83 kg     Pain Score:  Pain Score: 4     Physical Exam:  Constitutional: No acute distress, sitting up in bed.  Neuro: Alert, oriented. Follows commands.   Eyes: EOMI. No scleral icterus. Conjunctiva pink.  ENT: MMM.   Heart: Regular rate.  Respiratory: No increased work of breathing or audible wheeze.  Abdomen: Soft, nondistended. Appropriately tender. Incisions clean, dry, intact. YUE serosanguinous  MSK: Moves all extremities.  Vascular: Palpable pulses throughout, no pitting edema.  Skin: No rashes.   Psychological: Appropriate mood and behavior.            Relevant Results  Laboratory Results:  CBC:   Lab Results   Component Value Date    WBC 9.2 10/22/2023    RBC 3.74 (L) 10/22/2023    HGB 10.2 (L) 10/22/2023    HCT 33.5 (L) 10/22/2023     10/22/2023       RFP:   Lab Results   Component Value Date     (L) 10/22/2023    K 3.7 10/22/2023     10/22/2023    CO2 23 10/22/2023    BUN 12 10/22/2023    CREATININE 0.94 10/22/2023    CALCIUM 8.0 (L) 10/22/2023    MG 1.53 (L) 10/21/2023         Assessment/Plan   This is a 73 y.o. Female who is POD 3 from OPEN RIGHT COLON RESECTION for large ascending polyp. Doing well. Progressing as expected.      Plan:   -- Full liquid diet  -- Ensure supplements  -- Tylenol, toradol, flexeril, prn narcotic  -- Antiemetics, IVF  -- Ambulate  -- DVT ppx lovenox 40 mg daily, SCD  -- Possible DC home tomorrow           Seen " and discussed with Dr. Hendricks who is in agreement with plan. Please doc halo with questions.    Iman Antoine PA-C

## 2023-10-23 NOTE — CARE PLAN
The patient's goals for the shift include  pain management and sleep     Problem: Fall/Injury  Goal: Not fall by end of shift  Outcome: Progressing     Problem: Psychosocial Needs  Goal: Demonstrates ability to cope with hospitalization/illness  Outcome: Progressing     Problem: Meds/Post-op Pain  Goal: Pain controlled to tolerate pain level  Outcome: Progressing     The clinical goals for the shift include patients pain will be managed to allow for at least 6 hours of sleep.    Over the shift, the patient was able to get at least 6 hours of sleep. Pain was managed well. Patient is coping with her hospitalization. Pain was managed with repositioning and medications. Patient was free from falls this shift. VS remained stable. Patient is sleeping with personal items and the call light within reach.

## 2023-10-24 ENCOUNTER — PHARMACY VISIT (OUTPATIENT)
Dept: PHARMACY | Facility: CLINIC | Age: 73
End: 2023-10-24
Payer: MEDICARE

## 2023-10-24 VITALS
HEART RATE: 93 BPM | WEIGHT: 182.98 LBS | SYSTOLIC BLOOD PRESSURE: 141 MMHG | TEMPERATURE: 97.2 F | BODY MASS INDEX: 29.41 KG/M2 | RESPIRATION RATE: 18 BRPM | HEIGHT: 66 IN | DIASTOLIC BLOOD PRESSURE: 63 MMHG | OXYGEN SATURATION: 94 %

## 2023-10-24 PROBLEM — K63.5 POLYP OF ASCENDING COLON, UNSPECIFIED TYPE: Status: RESOLVED | Noted: 2023-10-20 | Resolved: 2023-10-24

## 2023-10-24 PROCEDURE — 2500000004 HC RX 250 GENERAL PHARMACY W/ HCPCS (ALT 636 FOR OP/ED): Performed by: PHYSICIAN ASSISTANT

## 2023-10-24 PROCEDURE — RXMED WILLOW AMBULATORY MEDICATION CHARGE

## 2023-10-24 PROCEDURE — 2500000004 HC RX 250 GENERAL PHARMACY W/ HCPCS (ALT 636 FOR OP/ED): Performed by: SURGERY

## 2023-10-24 PROCEDURE — 2500000001 HC RX 250 WO HCPCS SELF ADMINISTERED DRUGS (ALT 637 FOR MEDICARE OP): Performed by: SURGERY

## 2023-10-24 RX ORDER — CYCLOBENZAPRINE HCL 5 MG
5 TABLET ORAL 2 TIMES DAILY
Qty: 10 TABLET | Refills: 0 | Status: SHIPPED | OUTPATIENT
Start: 2023-10-24 | End: 2023-10-29

## 2023-10-24 RX ORDER — DOCUSATE SODIUM 100 MG/1
100 CAPSULE, LIQUID FILLED ORAL 2 TIMES DAILY
Qty: 10 CAPSULE | Refills: 0 | Status: SHIPPED | OUTPATIENT
Start: 2023-10-24 | End: 2023-10-29

## 2023-10-24 RX ORDER — OXYCODONE HYDROCHLORIDE 5 MG/1
5 TABLET ORAL EVERY 6 HOURS PRN
Qty: 12 TABLET | Refills: 0 | Status: SHIPPED | OUTPATIENT
Start: 2023-10-24 | End: 2023-10-27

## 2023-10-24 RX ORDER — IBUPROFEN 600 MG/1
600 TABLET ORAL EVERY 6 HOURS PRN
Qty: 20 TABLET | Refills: 0 | Status: SHIPPED | OUTPATIENT
Start: 2023-10-24 | End: 2023-11-03

## 2023-10-24 RX ADMIN — KETOROLAC TROMETHAMINE 15 MG: 15 INJECTION, SOLUTION INTRAMUSCULAR; INTRAVENOUS at 05:27

## 2023-10-24 RX ADMIN — CYCLOBENZAPRINE HYDROCHLORIDE 5 MG: 5 TABLET, FILM COATED ORAL at 08:29

## 2023-10-24 RX ADMIN — GABAPENTIN 100 MG: 100 CAPSULE ORAL at 08:29

## 2023-10-24 RX ADMIN — SODIUM CHLORIDE 75 ML/HR: 9 INJECTION, SOLUTION INTRAVENOUS at 00:42

## 2023-10-24 ASSESSMENT — COGNITIVE AND FUNCTIONAL STATUS - GENERAL
CLIMB 3 TO 5 STEPS WITH RAILING: A LITTLE
MOVING TO AND FROM BED TO CHAIR: A LITTLE
MOBILITY SCORE: 20
STANDING UP FROM CHAIR USING ARMS: A LITTLE
WALKING IN HOSPITAL ROOM: A LITTLE
PATIENT BASELINE BEDBOUND: NO
WALKING IN HOSPITAL ROOM: A LITTLE
DRESSING REGULAR LOWER BODY CLOTHING: A LITTLE
DRESSING REGULAR LOWER BODY CLOTHING: A LITTLE
MOBILITY SCORE: 20
DAILY ACTIVITIY SCORE: 23
MOVING TO AND FROM BED TO CHAIR: A LITTLE
DAILY ACTIVITIY SCORE: 23
CLIMB 3 TO 5 STEPS WITH RAILING: A LITTLE
STANDING UP FROM CHAIR USING ARMS: A LITTLE

## 2023-10-24 ASSESSMENT — PAIN - FUNCTIONAL ASSESSMENT: PAIN_FUNCTIONAL_ASSESSMENT: 0-10

## 2023-10-24 ASSESSMENT — PAIN SCALES - GENERAL
PAINLEVEL_OUTOF10: 2
PAINLEVEL_OUTOF10: 0 - NO PAIN

## 2023-10-24 NOTE — CARE PLAN
Problem: Fall/Injury  Goal: Not fall by end of shift  Outcome: Progressing  Goal: Be free from injury by end of the shift  Outcome: Progressing  Goal: Verbalize understanding of personal risk factors for fall in the hospital  Outcome: Progressing  Goal: Verbalize understanding of risk factor reduction measures to prevent injury from fall in the home  Outcome: Progressing  Goal: Use assistive devices by end of the shift  Outcome: Progressing  Goal: Pace activities to prevent fatigue by end of the shift  Outcome: Progressing     Problem: Safety  Goal: Patient will be injury free during hospitalization  Outcome: Progressing  Goal: I will remain free of falls  Outcome: Progressing     Problem: Daily Care  Goal: Daily care needs are met  Outcome: Progressing     Problem: Psychosocial Needs  Goal: Demonstrates ability to cope with hospitalization/illness  Outcome: Progressing  Goal: Collaborate with me, my family, and caregiver to identify my specific goals  Outcome: Progressing     Problem: Discharge Barriers  Goal: My discharge needs are met  Outcome: Progressing     Problem: Deep Vein Thrombosis  Goal: I will remain free from complications of deep vein thrombosis and maintain current level of mobility  Outcome: Progressing     Problem: Meds/Post-op Pain  Goal: Pain controlled to tolerate pain level  Outcome: Progressing  Goal: Tolerates prescribed medication  Outcome: Progressing     Problem: DVT/VTE Prevention/Activity  Goal: No decrease in circulation/sensation  Outcome: Progressing  Goal: Prevent skin breakdown  Outcome: Progressing  Goal: Return to preop oxygenation status  Outcome: Progressing  Goal: Tolerates optimal activity  Outcome: Progressing  Goal: Increase self care and/or family involvement in 24 hrs.  Outcome: Progressing     Problem: Wound care/infection prevention  Goal: No signs of infection in 24 hrs.  Outcome: Progressing  Goal: No unexpected bleeding from incision this shift  Outcome:  Progressing     Problem: Diet/fluid balance  Goal: Adequate urinary output  Outcome: Progressing  Goal: Free from nausea/vomiting  Outcome: Progressing  Goal: Return in bowel function  Outcome: Progressing  Goal: Tolerates prescribed diet  Outcome: Progressing     Problem: Other goals  Goal: No change in neurological status  Outcome: Progressing  Goal: Stabilize vital signs (return to 10% of baseline)  Outcome: Progressing   The patient's goals for the shift include      The clinical goals for the shift include tolerate diet    Over the shift, the patient did not make progress toward the following goals. Barriers to progression include none. Recommendations to address these barriers include none.

## 2023-10-24 NOTE — CARE PLAN
The patient's goals for the shift include  being able to sleep at least 6 hours this shift.     The clinical goals for the shift include Pain rated at or below a well controlled number.      Over the shift, the patient did make progress toward the following goals. Barriers to progression include pain.   Recommendations to address these barriers include medications, relaxation, elevation, and ice.   Patient tolerated ambulation to the bathroom this shift. Patient was able to void.   Patient was able to sleep at least 6 hours this shift, on and off.

## 2023-10-24 NOTE — DISCHARGE SUMMARY
Discharge Diagnosis  Polyp of ascending colon, unspecified type    Issues Requiring Follow-Up  Follow up with Dr. Hendricks on Tuesday 10-31 for drain removal    Test Results Pending At Discharge  Pending Labs       Order Current Status    Surgical Pathology Exam In process            Hospital Course   Ms. Thayer was admitted after a right open colectomy for a large ascending polyp. Tolerated procedure well, tolerated up to a soft diet and had return of bowel function. Ambulatory in hospital. Discharged home with follow up next Tuesday.     Pertinent Physical Exam At Time of Discharge  Physical Exam  Constitutional: No acute distress, sitting up in bed.  Neuro: Alert, oriented. Follows commands.   Eyes: EOMI. No scleral icterus. Conjunctiva pink.  ENT: MMM.   Heart: Regular rate.  Respiratory: No increased work of breathing or audible wheeze.  Abdomen: Soft, nondistended. Appropriately tender. Incisions clean, dry, intact. YUE serosanguinous   MSK: Moves all extremities.  Vascular: Palpable pulses throughout, no pitting edema.  Skin: No rashes.   Psychological: Appropriate mood and behavior.     Home Medications     Medication List      START taking these medications     cyclobenzaprine 5 mg tablet; Commonly known as: Flexeril; Take 1 tablet   (5 mg) by mouth 2 times a day for 5 days.   docusate sodium 100 mg tablet; Commonly known as: Colace; Take 1 tablet   (100 mg) by mouth 2 times a day for 10 doses.   ibuprofen 600 mg tablet; Take 1 tablet (600 mg) by mouth every 6 hours   if needed for moderate pain (4 - 6) for up to 20 doses.   oxyCODONE 5 mg immediate release tablet; Commonly known as: Roxicodone;   Take 1 tablet (5 mg) by mouth every 6 hours if needed for moderate pain (4   - 6) for up to 3 days.     CONTINUE taking these medications     acetaminophen 500 mg tablet; Commonly known as: Tylenol   chlorhexidine 0.12 % solution; Commonly known as: Peridex; Use 15 mL in   the mouth or throat once daily. Swish and  spit one capful the night before   surgery and morning  of surgery   cyanocobalamin 250 mcg tablet; Commonly known as: Vitamin B-12   ECHINACEA ORAL   ferrous sulfate 325 (65 Fe) MG EC tablet       Outpatient Follow-Up  Future Appointments   Date Time Provider Department Center   11/24/2023  9:00 AM [facility] 1 [facility] [place] [facility]   11/30/2023  9:30 AM Reyna Mcgrgeor PA-C [id number]       Iman Antoine PA-C

## 2023-10-31 ENCOUNTER — OFFICE VISIT (OUTPATIENT)
Dept: SURGERY | Facility: CLINIC | Age: 73
End: 2023-10-31
Payer: MEDICARE

## 2023-10-31 VITALS
DIASTOLIC BLOOD PRESSURE: 71 MMHG | OXYGEN SATURATION: 96 % | BODY MASS INDEX: 25.51 KG/M2 | SYSTOLIC BLOOD PRESSURE: 131 MMHG | HEART RATE: 104 BPM | HEIGHT: 70 IN | WEIGHT: 178.2 LBS

## 2023-10-31 DIAGNOSIS — Z09 POSTOPERATIVE EXAMINATION: Primary | ICD-10-CM

## 2023-10-31 PROCEDURE — 1159F MED LIST DOCD IN RCRD: CPT | Performed by: SURGERY

## 2023-10-31 PROCEDURE — 1036F TOBACCO NON-USER: CPT | Performed by: SURGERY

## 2023-10-31 PROCEDURE — 1111F DSCHRG MED/CURRENT MED MERGE: CPT | Performed by: SURGERY

## 2023-10-31 PROCEDURE — 1126F AMNT PAIN NOTED NONE PRSNT: CPT | Performed by: SURGERY

## 2023-10-31 PROCEDURE — 99024 POSTOP FOLLOW-UP VISIT: CPT | Performed by: SURGERY

## 2023-10-31 NOTE — PROGRESS NOTES
Subjective   Patient ID: Aurelia Thayer is a 73 y.o. female who presents for Post-op (10/20/2023 open right colon resection/ drain removal. ).  HPI  Doing well   Soft diet   Moves bowel daily, was loose, soft now. No concern.   Drain clear, about 20-30cc daily   No fever or chills   Has abdominal cramps a couple of days ago, resolved on its own    Review of Systems   All other systems reviewed and are negative.      Objective   Physical Exam  Constitutional:       Appearance: Normal appearance.   HENT:      Head: Atraumatic.   Pulmonary:      Effort: Pulmonary effort is normal.   Abdominal:      General: Abdomen is flat.      Palpations: Abdomen is soft.      Comments: Incision has healed well.   Staples removed. Steristrips applies   Drain: Serous fluids. Removed.          Assessment/Plan   10/20/2023 open right colon resection and LENNY for ascending colon large polyp  Adequate progress     See me back in 2 weeks   Path pending   Avoid heavy lifting more than 20LB and wear binder for 6 more weeks   Call if any concern

## 2023-11-08 LAB
LABORATORY COMMENT REPORT: NORMAL
PATH REPORT.FINAL DX SPEC: NORMAL
PATH REPORT.GROSS SPEC: NORMAL
PATH REPORT.TOTAL CANCER: NORMAL

## 2023-11-13 LAB
ATRIAL RATE: 65 BPM
P AXIS: 44 DEGREES
P OFFSET: 200 MS
P ONSET: 152 MS
PR INTERVAL: 146 MS
Q ONSET: 225 MS
QRS COUNT: 11 BEATS
QRS DURATION: 82 MS
QT INTERVAL: 394 MS
QTC CALCULATION(BAZETT): 409 MS
QTC FREDERICIA: 404 MS
R AXIS: 24 DEGREES
T AXIS: 49 DEGREES
T OFFSET: 422 MS
VENTRICULAR RATE: 65 BPM

## 2023-11-14 ENCOUNTER — OFFICE VISIT (OUTPATIENT)
Dept: SURGERY | Facility: CLINIC | Age: 73
End: 2023-11-14
Payer: MEDICARE

## 2023-11-14 VITALS
BODY MASS INDEX: 24.9 KG/M2 | SYSTOLIC BLOOD PRESSURE: 120 MMHG | HEART RATE: 87 BPM | HEIGHT: 70 IN | DIASTOLIC BLOOD PRESSURE: 68 MMHG | WEIGHT: 173.9 LBS | OXYGEN SATURATION: 97 %

## 2023-11-14 DIAGNOSIS — Z90.49 S/P PARTIAL RESECTION OF COLON: Primary | ICD-10-CM

## 2023-11-14 PROCEDURE — 99024 POSTOP FOLLOW-UP VISIT: CPT | Performed by: SURGERY

## 2023-11-14 PROCEDURE — 1036F TOBACCO NON-USER: CPT | Performed by: SURGERY

## 2023-11-14 PROCEDURE — 1126F AMNT PAIN NOTED NONE PRSNT: CPT | Performed by: SURGERY

## 2023-11-14 PROCEDURE — 1111F DSCHRG MED/CURRENT MED MERGE: CPT | Performed by: SURGERY

## 2023-11-14 PROCEDURE — 1159F MED LIST DOCD IN RCRD: CPT | Performed by: SURGERY

## 2023-11-14 NOTE — PROGRESS NOTES
General Surgery Office Follow Up    Chief Complaint:  Follow up     Interval History:  Aurelia Thayer is a 73 y.o. female who came today for follow up.   10/20/2023 open right colon resection and LENNY for ascending colon large polyp  Was seen on 10/31 for postop follow up and drain removal and staple removal   Doing well   Getting stronger every day   No problem with eating or bowel function   No fever or chills   Very happy about the progress       Past Medical History:  Past Medical History:   Diagnosis Date    Colon cancer (CMS/HCC)     Peripheral neuropathy         Past Surgical History:  Past Surgical History:   Procedure Laterality Date    APPENDECTOMY      CHOLECYSTECTOMY      CT GUIDED PERCUTANEOUS PERITONEAL OR RETROPERITONEAL FLUID COLLECTION DRAINAGE  11/09/2021    CT GUIDED PERCUTANEOUS PERITONEAL OR RETROPERITONEAL FLUID COLLECTION DRAINAGE 11/9/2021 [facility] CLINICAL LEGACY    ECTOPIC PREGNANCY SURGERY      MEDIPORT      ORIF HIP FRACTURE      left 8/22    ORIF RADIAL SHAFT FRACTURE      OTHER SURGICAL HISTORY  12/02/2021    Colectomy subtotal    OTHER SURGICAL HISTORY  12/02/2021    Colostomy        Medications:  Current Outpatient Medications   Medication Instructions    acetaminophen (Tylenol) 500 mg tablet 2 tablets, oral, Every 6 hours PRN    chlorhexidine (Peridex) 0.12 % solution 15 mL, Mouth/Throat, Daily, Swish and spit one capful the night before surgery and morning  of surgery    cyanocobalamin (VITAMIN B-12) 250 mcg, oral, Daily    ECHINACEA ORAL 1 tablet, oral, Every other day    ferrous sulfate 65 mg, oral, Daily with breakfast, Do not crush, chew, or split.        Allergies:  Allergies   Allergen Reactions    Penicillins Rash        Family History:  Family History   Problem Relation Name Age of Onset    Heart attack Father  60 - 69        Social History:  Social History     Socioeconomic History    Marital status:      Spouse name: Not on file    Number of children: Not on file     Years of education: Not on file    Highest education level: Not on file   Occupational History    Not on file   Tobacco Use    Smoking status: Never    Smokeless tobacco: Never   Vaping Use    Vaping Use: Never used   Substance and Sexual Activity    Alcohol use: Yes     Comment: 1 time a month    Drug use: Not on file    Sexual activity: Yes   Other Topics Concern    Not on file   Social History Narrative    Not on file     Social Determinants of Health     Financial Resource Strain: Low Risk  (10/23/2023)    Overall Financial Resource Strain (CARDIA)     Difficulty of Paying Living Expenses: Not hard at all   Food Insecurity: Unknown (10/20/2023)    Hunger Vital Sign     Worried About Running Out of Food in the Last Year: Never true     Ran Out of Food in the Last Year: Not on file   Transportation Needs: No Transportation Needs (10/23/2023)    PRAPARE - Transportation     Lack of Transportation (Medical): No     Lack of Transportation (Non-Medical): No   Physical Activity: Not on file   Stress: Stress Concern Present (10/20/2023)    [employer] [place] [employer] - Occupational Stress Questionnaire     Feeling of Stress : To some extent   Social Connections: Moderately Isolated (10/20/2023)    Social Connection and Isolation Panel [NHANES]     Frequency of Communication with Friends and Family: Never     Frequency of Social Gatherings with Friends and Family: Never     Attends Hindu Services: Never     Active Member of Clubs or Organizations: Yes     Attends Club or Organization Meetings: Never     Marital Status:    Intimate Partner Violence: Not At Risk (10/20/2023)    Humiliation, Afraid, Rape, and Kick questionnaire     Fear of Current or Ex-Partner: No     Emotionally Abused: No     Physically Abused: No     Sexually Abused: No   Housing Stability: Low Risk  (10/23/2023)    Housing Stability Vital Sign     Unable to Pay for Housing in the Last Year: No     Number of Places Lived in the  Last Year: 1     Unstable Housing in the Last Year: No        Review of Systems:  A complete 12 point review of systems was performed. It is otherwise negative except as noted in the above interval history.     Vital Signs:  Vitals:    11/14/23 1005   BP: 120/68   Pulse: 87   SpO2: 97%      Body mass index is 24.95 kg/m².      Physical Exam:  General: No acute distress.   Neuro: Alert and oriented ×3. Follows commands.  Face: Atraumatic, no visible skin lesion.   Head: Atraumatic  Eyes: Pupils equal reactive to light. Extraocular motions intact.  Ears: Hears normal speaking voice.  Mouth, Nose, Throat: Mucous membranes moist.  Normal dentition.  Neck: Supple. No visible masses.  Breast: Not examined.   Lung: Patent airway and no labored breath.   Abdomen: soft, NT, ND. Incision has healed well.   Rectal and Perianal: Not examined.   Genitourinary: Not examined.   Musculoskeletal: Moves all extremities.  Normal range of motion.  Extremities: No cyanosis. No edema.   Skin: No rashes or lesions.  Psychological: Normal affect      Laboratory Values:  CBC:   Lab Results   Component Value Date    WBC 9.2 10/22/2023    RBC 3.74 (L) 10/22/2023    HGB 10.2 (L) 10/22/2023    HCT 33.5 (L) 10/22/2023     10/22/2023       RFP:   Lab Results   Component Value Date     (L) 10/22/2023    K 3.7 10/22/2023     10/22/2023    CO2 23 10/22/2023    BUN 12 10/22/2023    CREATININE 0.94 10/22/2023    CALCIUM 8.0 (L) 10/22/2023    MG 1.53 (L) 10/21/2023    PHOS 4.1 02/09/2023        LFTs:   Lab Results   Component Value Date    PROT 6.7 10/13/2023    ALBUMIN 3.9 10/13/2023    BILITOT 0.4 10/13/2023    BILIDIR 0.1 11/20/2021    ALKPHOS 83 10/13/2023    AST 19 10/13/2023    ALT 14 10/13/2023            Imaging:  I have personally reviewed the images and the radiologist's report.  ECG 12 Lead    Result Date: 11/13/2023  Normal sinus rhythm Normal ECG When compared with ECG of 30-AUG-2022 15:08, Previous ECG has undetermined  rhythm, needs review QT has shortened Confirmed by Mohsen Naqvi ([id number]) on 11/13/2023 3:31:25 PM    Path:       Component    FINAL DIAGNOSIS   RIGHT COLON AND TERMINAL ILEUM, RIGHT HEMICOLECTOMY:  - TUBULOVILLOUS ADENOMA (2.3 CM) OF THE ASCENDING COLON.  - NEGATIVE FOR HIGH-GRADE DYSPLASIA AND INVASIVE ADENOCARCINOMA.  - SURGICAL RESECTION MARGINS ARE NEGATIVE FOR DYSPLASIA.  - TATTOO INK IS IDENTIFIED.  - SEROSAL ADHESIONS AND ACUTE SEROSITIS.  - TWENTY (20) BENIGN LYMPH NODES.         Assessment:  This is a 73 y.o. female who has   10/20/2023 open right colon resection and LENNY for ascending colon large polyp  Doing well   Previous left colon resection for left colon cancer     Plan:  Discussed pathology   See me back in 3 months   Has appointment with Oncology Reyna Mcgregor PA-C on 11/30  Instructed to call if any concern       Sushma Hendricks MD [facility]  General Surgery  Office: [phone]  Fax:     [phone]  10:39 AM   11/14/23

## 2023-11-24 ENCOUNTER — HOSPITAL ENCOUNTER (OUTPATIENT)
Dept: RADIOLOGY | Facility: HOSPITAL | Age: 73
Discharge: HOME | End: 2023-11-24
Payer: MEDICARE

## 2023-11-24 DIAGNOSIS — C18.9 MALIGNANT NEOPLASM OF COLON, UNSPECIFIED (MULTI): ICD-10-CM

## 2023-11-24 PROCEDURE — 74177 CT ABD & PELVIS W/CONTRAST: CPT | Performed by: RADIOLOGY

## 2023-11-24 PROCEDURE — 71260 CT THORAX DX C+: CPT

## 2023-11-24 PROCEDURE — A9698 NON-RAD CONTRAST MATERIALNOC: HCPCS | Performed by: PHYSICIAN ASSISTANT

## 2023-11-24 PROCEDURE — 2550000001 HC RX 255 CONTRASTS: Performed by: PHYSICIAN ASSISTANT

## 2023-11-24 PROCEDURE — 71260 CT THORAX DX C+: CPT | Performed by: RADIOLOGY

## 2023-11-24 RX ADMIN — IOHEXOL 90 ML: 350 INJECTION, SOLUTION INTRAVENOUS at 10:06

## 2023-11-24 RX ADMIN — IOHEXOL 400 ML: 12 SOLUTION ORAL at 10:05

## 2023-11-29 DIAGNOSIS — C18.9 ADENOCARCINOMA, COLON (MULTI): Primary | ICD-10-CM

## 2023-11-30 ENCOUNTER — OFFICE VISIT (OUTPATIENT)
Dept: HEMATOLOGY/ONCOLOGY | Facility: CLINIC | Age: 73
End: 2023-11-30
Payer: MEDICARE

## 2023-11-30 VITALS
BODY MASS INDEX: 26.36 KG/M2 | OXYGEN SATURATION: 99 % | WEIGHT: 173.94 LBS | HEART RATE: 70 BPM | RESPIRATION RATE: 18 BRPM | TEMPERATURE: 98.1 F | HEIGHT: 68 IN | DIASTOLIC BLOOD PRESSURE: 75 MMHG | SYSTOLIC BLOOD PRESSURE: 128 MMHG

## 2023-11-30 DIAGNOSIS — C18.9 ADENOCARCINOMA, COLON (MULTI): ICD-10-CM

## 2023-11-30 LAB
ALBUMIN SERPL BCP-MCNC: 3.9 G/DL (ref 3.4–5)
ALP SERPL-CCNC: 75 U/L (ref 33–136)
ALT SERPL W P-5'-P-CCNC: 9 U/L (ref 7–45)
ANION GAP SERPL CALC-SCNC: 14 MMOL/L (ref 10–20)
AST SERPL W P-5'-P-CCNC: 15 U/L (ref 9–39)
BASOPHILS # BLD AUTO: 0.01 X10*3/UL (ref 0–0.1)
BASOPHILS NFR BLD AUTO: 0.2 %
BILIRUB SERPL-MCNC: 0.4 MG/DL (ref 0–1.2)
BUN SERPL-MCNC: 15 MG/DL (ref 6–23)
CALCIUM SERPL-MCNC: 8.9 MG/DL (ref 8.6–10.3)
CEA SERPL-MCNC: 1.3 UG/L
CHLORIDE SERPL-SCNC: 105 MMOL/L (ref 98–107)
CO2 SERPL-SCNC: 27 MMOL/L (ref 21–32)
CREAT SERPL-MCNC: 0.96 MG/DL (ref 0.5–1.05)
EOSINOPHIL # BLD AUTO: 0.18 X10*3/UL (ref 0–0.4)
EOSINOPHIL NFR BLD AUTO: 3.2 %
ERYTHROCYTE [DISTWIDTH] IN BLOOD BY AUTOMATED COUNT: 14.5 % (ref 11.5–14.5)
FERRITIN SERPL-MCNC: 194 NG/ML (ref 8–150)
GFR SERPL CREATININE-BSD FRML MDRD: 62 ML/MIN/1.73M*2
GLUCOSE SERPL-MCNC: 97 MG/DL (ref 74–99)
HCT VFR BLD AUTO: 33.3 % (ref 36–46)
HGB BLD-MCNC: 10 G/DL (ref 12–16)
IMM GRANULOCYTES # BLD AUTO: 0.01 X10*3/UL (ref 0–0.5)
IMM GRANULOCYTES NFR BLD AUTO: 0.2 % (ref 0–0.9)
IRON SATN MFR SERPL: 15 % (ref 25–45)
IRON SERPL-MCNC: 43 UG/DL (ref 35–150)
LYMPHOCYTES # BLD AUTO: 1.8 X10*3/UL (ref 0.8–3)
LYMPHOCYTES NFR BLD AUTO: 31.9 %
MCH RBC QN AUTO: 27 PG (ref 26–34)
MCHC RBC AUTO-ENTMCNC: 30 G/DL (ref 32–36)
MCV RBC AUTO: 90 FL (ref 80–100)
MONOCYTES # BLD AUTO: 0.39 X10*3/UL (ref 0.05–0.8)
MONOCYTES NFR BLD AUTO: 6.9 %
NEUTROPHILS # BLD AUTO: 3.26 X10*3/UL (ref 1.6–5.5)
NEUTROPHILS NFR BLD AUTO: 57.6 %
PLATELET # BLD AUTO: 249 X10*3/UL (ref 150–450)
POTASSIUM SERPL-SCNC: 4.2 MMOL/L (ref 3.5–5.3)
PROT SERPL-MCNC: 6.4 G/DL (ref 6.4–8.2)
RBC # BLD AUTO: 3.71 X10*6/UL (ref 4–5.2)
SODIUM SERPL-SCNC: 142 MMOL/L (ref 136–145)
TIBC SERPL-MCNC: 295 UG/DL (ref 240–445)
UIBC SERPL-MCNC: 252 UG/DL (ref 110–370)
VIT B12 SERPL-MCNC: 372 PG/ML (ref 211–911)
WBC # BLD AUTO: 5.7 X10*3/UL (ref 4.4–11.3)

## 2023-11-30 PROCEDURE — 1036F TOBACCO NON-USER: CPT | Performed by: PHYSICIAN ASSISTANT

## 2023-11-30 PROCEDURE — 83550 IRON BINDING TEST: CPT | Performed by: PHYSICIAN ASSISTANT

## 2023-11-30 PROCEDURE — 83540 ASSAY OF IRON: CPT | Performed by: PHYSICIAN ASSISTANT

## 2023-11-30 PROCEDURE — 80053 COMPREHEN METABOLIC PANEL: CPT | Performed by: PHYSICIAN ASSISTANT

## 2023-11-30 PROCEDURE — 82728 ASSAY OF FERRITIN: CPT | Performed by: PHYSICIAN ASSISTANT

## 2023-11-30 PROCEDURE — 36415 COLL VENOUS BLD VENIPUNCTURE: CPT | Performed by: PHYSICIAN ASSISTANT

## 2023-11-30 PROCEDURE — 1159F MED LIST DOCD IN RCRD: CPT | Performed by: PHYSICIAN ASSISTANT

## 2023-11-30 PROCEDURE — 99214 OFFICE O/P EST MOD 30 MIN: CPT | Performed by: PHYSICIAN ASSISTANT

## 2023-11-30 PROCEDURE — 82378 CARCINOEMBRYONIC ANTIGEN: CPT | Performed by: PHYSICIAN ASSISTANT

## 2023-11-30 PROCEDURE — 82607 VITAMIN B-12: CPT | Performed by: PHYSICIAN ASSISTANT

## 2023-11-30 PROCEDURE — 1126F AMNT PAIN NOTED NONE PRSNT: CPT | Performed by: PHYSICIAN ASSISTANT

## 2023-11-30 PROCEDURE — 85025 COMPLETE CBC W/AUTO DIFF WBC: CPT | Performed by: PHYSICIAN ASSISTANT

## 2023-11-30 PROCEDURE — 3008F BODY MASS INDEX DOCD: CPT | Performed by: PHYSICIAN ASSISTANT

## 2023-11-30 RX ORDER — GABAPENTIN 300 MG/1
300 CAPSULE ORAL NIGHTLY
Qty: 30 CAPSULE | Refills: 6 | Status: SHIPPED | OUTPATIENT
Start: 2023-11-30 | End: 2024-06-06 | Stop reason: SDUPTHER

## 2023-11-30 RX ORDER — GABAPENTIN 100 MG/1
100 CAPSULE ORAL NIGHTLY
Qty: 30 CAPSULE | Refills: 11 | Status: SHIPPED | OUTPATIENT
Start: 2023-11-30 | End: 2023-11-30

## 2023-11-30 ASSESSMENT — ENCOUNTER SYMPTOMS
OCCASIONAL FEELINGS OF UNSTEADINESS: 0
LOSS OF SENSATION IN FEET: 0
DEPRESSION: 0

## 2023-11-30 ASSESSMENT — COLUMBIA-SUICIDE SEVERITY RATING SCALE - C-SSRS
2. HAVE YOU ACTUALLY HAD ANY THOUGHTS OF KILLING YOURSELF?: NO
6. HAVE YOU EVER DONE ANYTHING, STARTED TO DO ANYTHING, OR PREPARED TO DO ANYTHING TO END YOUR LIFE?: NO
1. IN THE PAST MONTH, HAVE YOU WISHED YOU WERE DEAD OR WISHED YOU COULD GO TO SLEEP AND NOT WAKE UP?: NO

## 2023-11-30 ASSESSMENT — PATIENT HEALTH QUESTIONNAIRE - PHQ9
SUM OF ALL RESPONSES TO PHQ9 QUESTIONS 1 AND 2: 0
2. FEELING DOWN, DEPRESSED OR HOPELESS: NOT AT ALL
1. LITTLE INTEREST OR PLEASURE IN DOING THINGS: NOT AT ALL

## 2023-11-30 ASSESSMENT — PAIN SCALES - GENERAL: PAINLEVEL: 0-NO PAIN

## 2023-11-30 NOTE — PROGRESS NOTES
Patient Visit Information:   Visit Type: Follow Up Visit     Cancer History:   Treatment Synopsis:    Patient is a pleasant 71-year-old  female who was referred by Dr. Hendricks for evaluation and management of recently diagnosed colon cancer.    Patient presented to urgent care on 11/8/2021 for fatigue/weakness, low-grade fever. Noted to have significant LLQ tenderness and was sent to ED for further evaluation. CT abd/pelv obtained at that time revealed incompletely formed abscess/bowel perforation in the LLQ, likely secondary to diverticulitis. She was admitted for abd abscess, syncope, acute diverticulitis, and UTI. She had drain placed in diverticular abscess on 11/9/2021 w/ continuation of IV abx. Completed 10 day course of moxifloxacin for UTI. Of note ECHO was unremarkable.    She returned to the ED on 11/20/2021 for worsening abd pain. CT abd/pelv at that time showed new pigtail catheter in the region of a now near completely resolved abscess/bowel perforation. Focal colonic luminal narrowing, possibly stricture secondary to scarring, at the recent inflammatory site, with proximal colon dilation and moderate-sized stool burden, raising the possibility of colon obstruction. Therefore she was admitted and repeat CT abd/pelv on 11/22/2021 revealed pigtail catheter within the left lower quadrant without residual collection seen. Associated stable appearing wall thickening and reticulation adjacent to the colon within this region appears stable since comparison CT 11/20/2021. New region of mild wall thickening of the proximal transverse colon which may indicate developing colitis. Remainder findings appear stable since comparison CT abdomen pelvis 11/20/2021.    She underwent diagnostic laparoscopy, open left colon resection, colostomy, splenic flexure mobilization, and extensive lysis of adhesions on 11/23/2021. Pathology revealed invasive moderately differentiated adenocarcinoma w/ perforation and abscess  formation. Metastatic carcinoma involving 1/25 LNs. Margins negative for dysplasia and tumor. Omentum w/ fat necrosis, acute and chronic inflammation; negative for tumor. Normal mismatch repair protein expression.    Patient recovered from surgery, reports increased flatulence. She reports ~30 lbs weight loss over the last 6 months.    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 Benign haptic and renal cysts and right renal angiomylipoma. No metastatic disease to the chest. Progressive chronic interstitial changes, could be related to post chemotherapy.     Patient presents today for follow up. completed FOLFOX 7/27/2022. Oxaliplatin was held since cycle 8 of FOLFOX, pt tolerated much better; cycle 9 of FOLFOX was held during last treatment due to neutropenia.     c/o numbness of fingers and toes.  she reports that she feeling good. good energy and appetite. fever, chills, night sweats, abdominal pain, CP, SOB, and bleeding.    In 2/2023 had reversal of colostomy.     Past medical history: colon cancer as above, diverticulitis  Past surgical history: colectomy subtotal, colostomy, cholecystectomy, appendectomy  Family history: Denies family history of cancer or blood disorders.         History of Present Illness:   S/P  open right colon resection and LENNY for ascending colon large polyp on 10/20/2023. Path noted to be TVA 2.3 cm, NEGATIVE FOR HIGH-GRADE DYSPLASIA AND INVASIVE ADENOCARCINOMA.  SURGICAL RESECTION MARGINS ARE NEGATIVE FOR DYSPLASIA. TWENTY (20) BENIGN LYMPH NODES.    Patient presents for follow up accompanied by her . States that she feel well aside from neuropathy in feet have worsened. Stopped jose oral iron but will resume again.     Review of Systems:    All of the systems have been reviewed and are negative for complaints except what is stated in the HPI and/or past  "medical history       Allergies and Intolerances:       Allergies:   penicillins: Drug Category, Anaphylaxis, Active    Outpatient Medication Profile:  * Patient Currently Takes Medications as of 23-May-2023 10:38 documented in Structured Notes   polyethylene glycol 3350 oral powder for reconstitution: 17 gram(s) orally once a day, as needed for constipation, Start Date: 09-Feb-2023   ferrous sulfate:    Vitamin B Complex 100: 1  orally once a day      Family History: No Family History items are recorded in the problem list.     Social History:   Social Substance History:  · Smoking Status never smoker (1)     Performance:   ECOG Performance Status: 0 Fully Active     Visit Vitals  /75 (BP Location: Left arm, Patient Position: Sitting, BP Cuff Size: Adult)   Pulse 70   Temp 36.7 °C (98.1 °F) (Temporal)   Resp 18   Ht (S) 1.734 m (5' 8.27\")   Wt 78.9 kg (173 lb 15.1 oz)   SpO2 99%   BMI 26.24 kg/m²   Smoking Status Never   BSA 1.95 m²         Physical Exam:     Constitutional: Awake/alert/oriented x3, no distress, alert and cooperative   Eyes: PERRL, EOMI, clear sclera   ENMT: Mucous membranes moist   Head/Neck: Neck supple, no apparent injury, thyroid without mass or tenderness. No JVD, trachea midline, no bruits   Respiratory/Thorax: Patent airways, CTAB   Cardiovascular: Regular rate and rhythm   Gastrointestinal: Nondistended, soft, non-tender   Psychological: Appropriate mood and behavior   Skin: Dry, no lesions, no rashes     Office Visit on 11/30/2023   Component Date Value Ref Range Status    WBC 11/30/2023 5.7  4.4 - 11.3 x10*3/uL Final    RBC 11/30/2023 3.71 (L)  4.00 - 5.20 x10*6/uL Final    Hemoglobin 11/30/2023 10.0 (L)  12.0 - 16.0 g/dL Final    Hematocrit 11/30/2023 33.3 (L)  36.0 - 46.0 % Final    MCV 11/30/2023 90  80 - 100 fL Final    MCH 11/30/2023 27.0  26.0 - 34.0 pg Final    MCHC 11/30/2023 30.0 (L)  32.0 - 36.0 g/dL Final    RDW 11/30/2023 14.5  11.5 - 14.5 % Final    Platelets 11/30/2023 " 249  150 - 450 x10*3/uL Final    Neutrophils % 11/30/2023 57.6  40.0 - 80.0 % Final    Immature Granulocytes %, Automated 11/30/2023 0.2  0.0 - 0.9 % Final    Immature Granulocyte Count (IG) includes promyelocytes, myelocytes and metamyelocytes but does not include bands. Percent differential counts (%) should be interpreted in the context of the absolute cell counts (cells/UL).    Lymphocytes % 11/30/2023 31.9  13.0 - 44.0 % Final    Monocytes % 11/30/2023 6.9  2.0 - 10.0 % Final    Eosinophils % 11/30/2023 3.2  0.0 - 6.0 % Final    Basophils % 11/30/2023 0.2  0.0 - 2.0 % Final    Neutrophils Absolute 11/30/2023 3.26  1.60 - 5.50 x10*3/uL Final    Percent differential counts (%) should be interpreted in the context of the absolute cell counts (cells/uL).    Immature Granulocytes Absolute, Au* 11/30/2023 0.01  0.00 - 0.50 x10*3/uL Final    Lymphocytes Absolute 11/30/2023 1.80  0.80 - 3.00 x10*3/uL Final    Monocytes Absolute 11/30/2023 0.39  0.05 - 0.80 x10*3/uL Final    Eosinophils Absolute 11/30/2023 0.18  0.00 - 0.40 x10*3/uL Final    Basophils Absolute 11/30/2023 0.01  0.00 - 0.10 x10*3/uL Final    Glucose 11/30/2023 97  74 - 99 mg/dL Final    Sodium 11/30/2023 142  136 - 145 mmol/L Final    Potassium 11/30/2023 4.2  3.5 - 5.3 mmol/L Final    Chloride 11/30/2023 105  98 - 107 mmol/L Final    Bicarbonate 11/30/2023 27  21 - 32 mmol/L Final    Anion Gap 11/30/2023 14  10 - 20 mmol/L Final    Urea Nitrogen 11/30/2023 15  6 - 23 mg/dL Final    Creatinine 11/30/2023 0.96  0.50 - 1.05 mg/dL Final    eGFR 11/30/2023 62  >60 mL/min/1.73m*2 Final    Calculations of estimated GFR are performed using the 2021 CKD-EPI Study Refit equation without the race variable for the IDMS-Traceable creatinine methods.  https://jasn.asnjournals.org/content/early/2021/09/22/ASN.2255857623    Calcium 11/30/2023 8.9  8.6 - 10.3 mg/dL Final    Albumin 11/30/2023 3.9  3.4 - 5.0 g/dL Final    Alkaline Phosphatase 11/30/2023 75  33 - 136 U/L  Final    Total Protein 11/30/2023 6.4  6.4 - 8.2 g/dL Final    AST 11/30/2023 15  9 - 39 U/L Final    Bilirubin, Total 11/30/2023 0.4  0.0 - 1.2 mg/dL Final    ALT 11/30/2023 9  7 - 45 U/L Final    Patients treated with Sulfasalazine may generate falsely decreased results for ALT.    Ferritin 11/30/2023 194 (H)  8 - 150 ng/mL Final    Iron 11/30/2023 43  35 - 150 ug/dL Final    UIBC 11/30/2023 252  110 - 370 ug/dL Final    TIBC 11/30/2023 295  240 - 445 ug/dL Final    % Saturation 11/30/2023 15 (L)  25 - 45 % Final   Admission on 10/20/2023, Discharged on 10/24/2023   Component Date Value Ref Range Status    Case Report 10/20/2023    Final                    Value:Surgical Pathology                                Case: [id number]                                  Authorizing Provider:  Sushma Hendricks MD              Collected:           10/20/2023 1328              Ordering Location:     [facility]   Received:            10/20/2023 1500                                     OR                                                                           Pathologist:           Kayley Bajwa MD                                                            Specimen:    COLON - RIGHT HEMICOLECTOMY, RIGHT COLON                                                   FINAL DIAGNOSIS 10/20/2023    Final                    Value:This result contains rich text formatting which cannot be displayed here.      10/20/2023    Final                    Value:This result contains rich text formatting which cannot be displayed here.    Gross Description 10/20/2023    Final                    Value:This result contains rich text formatting which cannot be displayed here.    WBC 10/21/2023 8.5  4.4 - 11.3 x10*3/uL Final    nRBC 10/21/2023 0.0  0.0 - 0.0 /100 WBCs Final    RBC 10/21/2023 3.66 (L)  4.00 - 5.20 x10*6/uL Final    Hemoglobin 10/21/2023 10.0 (L)  12.0 - 16.0 g/dL Final    Hematocrit 10/21/2023 32.5 (L)  36.0 - 46.0 % Final    MCV  10/21/2023 89  80 - 100 fL Final    MCH 10/21/2023 27.3  26.0 - 34.0 pg Final    MCHC 10/21/2023 30.8 (L)  32.0 - 36.0 g/dL Final    RDW 10/21/2023 14.1  11.5 - 14.5 % Final    Platelets 10/21/2023 181  150 - 450 x10*3/uL Final    MPV 10/21/2023 10.3  7.5 - 11.5 fL Final    Glucose 10/21/2023 129 (H)  74 - 99 mg/dL Final    Sodium 10/21/2023 137  136 - 145 mmol/L Final    Potassium 10/21/2023 4.2  3.5 - 5.3 mmol/L Final    Chloride 10/21/2023 106  98 - 107 mmol/L Final    Bicarbonate 10/21/2023 24  21 - 32 mmol/L Final    Anion Gap 10/21/2023 11  10 - 20 mmol/L Final    Urea Nitrogen 10/21/2023 16  6 - 23 mg/dL Final    Creatinine 10/21/2023 1.09 (H)  0.50 - 1.05 mg/dL Final    eGFR 10/21/2023 53 (L)  >60 mL/min/1.73m*2 Final    Calculations of estimated GFR are performed using the 2021 CKD-EPI Study Refit equation without the race variable for the IDMS-Traceable creatinine methods.  https://jasn.asnjournals.org/content/early/2021/09/22/ASN.3957057878    Calcium 10/21/2023 8.0 (L)  8.6 - 10.3 mg/dL Final    Magnesium 10/21/2023 1.53 (L)  1.60 - 2.40 mg/dL Final    WBC 10/22/2023 9.2  4.4 - 11.3 x10*3/uL Final    nRBC 10/22/2023 0.0  0.0 - 0.0 /100 WBCs Final    RBC 10/22/2023 3.74 (L)  4.00 - 5.20 x10*6/uL Final    Hemoglobin 10/22/2023 10.2 (L)  12.0 - 16.0 g/dL Final    Hematocrit 10/22/2023 33.5 (L)  36.0 - 46.0 % Final    MCV 10/22/2023 90  80 - 100 fL Final    MCH 10/22/2023 27.3  26.0 - 34.0 pg Final    MCHC 10/22/2023 30.4 (L)  32.0 - 36.0 g/dL Final    RDW 10/22/2023 14.4  11.5 - 14.5 % Final    Platelets 10/22/2023 177  150 - 450 x10*3/uL Final    MPV 10/22/2023 10.6  7.5 - 11.5 fL Final    Glucose 10/22/2023 132 (H)  74 - 99 mg/dL Final    Sodium 10/22/2023 135 (L)  136 - 145 mmol/L Final    Potassium 10/22/2023 3.7  3.5 - 5.3 mmol/L Final    Chloride 10/22/2023 106  98 - 107 mmol/L Final    Bicarbonate 10/22/2023 23  21 - 32 mmol/L Final    Anion Gap 10/22/2023 10  10 - 20 mmol/L Final    Urea Nitrogen  10/22/2023 12  6 - 23 mg/dL Final    Creatinine 10/22/2023 0.94  0.50 - 1.05 mg/dL Final    eGFR 10/22/2023 64  >60 mL/min/1.73m*2 Final    Calculations of estimated GFR are performed using the 2021 CKD-EPI Study Refit equation without the race variable for the IDMS-Traceable creatinine methods.  https://jasn.asnjournals.org/content/early/2021/09/22/ASN.2283578238    Calcium 10/22/2023 8.0 (L)  8.6 - 10.3 mg/dL Final   Ancillary Procedure on 10/13/2023   Component Date Value Ref Range Status    ABO TYPE 10/13/2023 O   Final    Rh TYPE 10/13/2023 POS   Final    ANTIBODY SCREEN 10/13/2023 NEG   Final    Staph/MRSA Screen Culture 10/13/2023 No Staphylococcus aureus isolated   Final    Glucose 10/13/2023 93  74 - 99 mg/dL Final    Sodium 10/13/2023 136  136 - 145 mmol/L Final    Potassium 10/13/2023 5.0  3.5 - 5.3 mmol/L Final    Chloride 10/13/2023 103  98 - 107 mmol/L Final    Bicarbonate 10/13/2023 28  21 - 32 mmol/L Final    Anion Gap 10/13/2023 10  10 - 20 mmol/L Final    Urea Nitrogen 10/13/2023 19  6 - 23 mg/dL Final    Creatinine 10/13/2023 0.99  0.50 - 1.05 mg/dL Final    eGFR 10/13/2023 60 (L)  >60 mL/min/1.73m*2 Final    Calculations of estimated GFR are performed using the 2021 CKD-EPI Study Refit equation without the race variable for the IDMS-Traceable creatinine methods.  https://jasn.asnjournals.org/content/early/2021/09/22/ASN.7606595583    Calcium 10/13/2023 9.1  8.6 - 10.3 mg/dL Final    Albumin 10/13/2023 3.9  3.4 - 5.0 g/dL Final    Alkaline Phosphatase 10/13/2023 83  33 - 136 U/L Final    Total Protein 10/13/2023 6.7  6.4 - 8.2 g/dL Final    AST 10/13/2023 19  9 - 39 U/L Final    Bilirubin, Total 10/13/2023 0.4  0.0 - 1.2 mg/dL Final    ALT 10/13/2023 14  7 - 45 U/L Final    Patients treated with Sulfasalazine may generate falsely decreased results for ALT.    WBC 10/13/2023 6.3  4.4 - 11.3 x10*3/uL Final    nRBC 10/13/2023 0.0  0.0 - 0.0 /100 WBCs Final    RBC 10/13/2023 4.28  4.00 - 5.20 x10*6/uL  Final    Hemoglobin 10/13/2023 11.6 (L)  12.0 - 16.0 g/dL Final    Hematocrit 10/13/2023 37.4  36.0 - 46.0 % Final    MCV 10/13/2023 87  80 - 100 fL Final    MCH 10/13/2023 27.1  26.0 - 34.0 pg Final    MCHC 10/13/2023 31.0 (L)  32.0 - 36.0 g/dL Final    RDW 10/13/2023 14.2  11.5 - 14.5 % Final    Platelets 10/13/2023 202  150 - 450 x10*3/uL Final    MPV 10/13/2023 9.8  7.5 - 11.5 fL Final    Neutrophils % 10/13/2023 65.0  40.0 - 80.0 % Final    Immature Granulocytes %, Automated 10/13/2023 0.2  0.0 - 0.9 % Final    Immature Granulocyte Count (IG) includes promyelocytes, myelocytes and metamyelocytes but does not include bands. Percent differential counts (%) should be interpreted in the context of the absolute cell counts (cells/UL).    Lymphocytes % 10/13/2023 25.5  13.0 - 44.0 % Final    Monocytes % 10/13/2023 6.9  2.0 - 10.0 % Final    Eosinophils % 10/13/2023 1.8  0.0 - 6.0 % Final    Basophils % 10/13/2023 0.6  0.0 - 2.0 % Final    Neutrophils Absolute 10/13/2023 4.08  1.60 - 5.50 x10*3/uL Final    Percent differential counts (%) should be interpreted in the context of the absolute cell counts (cells/uL).    Immature Granulocytes Absolute, Au* 10/13/2023 0.01  0.00 - 0.50 x10*3/uL Final    Lymphocytes Absolute 10/13/2023 1.60  0.80 - 3.00 x10*3/uL Final    Monocytes Absolute 10/13/2023 0.43  0.05 - 0.80 x10*3/uL Final    Eosinophils Absolute 10/13/2023 0.11  0.00 - 0.40 x10*3/uL Final    Basophils Absolute 10/13/2023 0.04  0.00 - 0.10 x10*3/uL Final    Protime 10/13/2023 11.6  9.8 - 12.8 seconds Final    INR 10/13/2023 1.0  0.9 - 1.1 Final    Ventricular Rate 10/17/2023 65  BPM Final    Atrial Rate 10/17/2023 65  BPM Final    WV Interval 10/17/2023 146  ms Final    QRS Duration 10/17/2023 82  ms Final    QT Interval 10/17/2023 394  ms Final    QTC Calculation(Bazett) 10/17/2023 409  ms Final    P Axis 10/17/2023 44  degrees Final    R Axis 10/17/2023 24  degrees Final    T Axis 10/17/2023 49  degrees Final     QRS Count 10/17/2023 11  beats Final    Q Onset 10/17/2023 225  ms Final    P Onset 10/17/2023 152  ms Final    P Offset 10/17/2023 200  ms Final    T Offset 10/17/2023 422  ms Final    QTC Fredericia 10/17/2023 404  ms Final       CT chest abdomen pelvis w IV contrast    Result Date: 11/25/2023  Interpreted By:  Kulwinder Parekh  and Marcelino Connolly STUDY: CT CHEST ABDOMEN PELVIS W IV CONTRAST;  11/24/2023 10:04 am   INDICATION: 73-year-old female with history of sigmoid colon cancer status post resection in 11/22. More recently with a large ascending colon tubulovillous adenoma status post open right hemicolectomy on 10/20/2023.   COMPARISON: CT chest abdomen pelvis dated 05/16/2023 CT abdomen pelvis dated 11/16/2022   ACCESSION NUMBER(S): [id number]   ORDERING CLINICIAN: MEGAN PARKER   TECHNIQUE: CT of the chest, abdomen, and pelvis was performed.  Contiguous axial images were obtained at 3 mm slice thickness through the chest, abdomen and pelvis. Coronal and sagittal reconstructions at 3 mm slice thickness were performed. 90 ml of contrast Omnipaque 350 were administered intravenously without immediate complication. Positive oral contrast was administered.   FINDINGS: CHEST:   LUNG/PLEURA/LARGE AIRWAYS: Trachea and central airways are patent.   There are faint residual subpleural reticular opacities again seen in the bilateral lower lobes and posterior aspect of the left upper lobe which are likely sequela of a remote infectious or inflammatory process. There are otherwise no focal consolidations, pleural effusions, or pneumothorax. No suspicious pulmonary nodules.   VESSELS: Aorta and pulmonary arteries are normal caliber.  Mild atherosclerotic changes are noted of the aorta and branching vessels. Minimal coronary artery calcifications are present.   HEART: The heart is normal in size.  Trace pericardial fluid is again noted.   MEDIASTINUM AND LANDON: No mediastinal, hilar or axillary lymphadenopathy is  present.  The esophagus is normal.   CHEST WALL AND LOWER NECK: The soft tissues of the chest wall demonstrate no gross abnormality. The visualized thyroid gland appears within normal limits.   ABDOMEN:   LIVER: The liver is normal in size and enhancement. Stable 4.4 cm simple hepatic cyst in segment 8. Stable 0.9 cm hypoattenuating lesion in segment 2 which is too small to characterize but is unchanged from 04/11/2022 and likely represents an additional cyst. No new hepatic lesions.   BILE DUCTS: The intrahepatic and extrahepatic ducts are not dilated.   GALLBLADDER: No calcified stones. No wall thickening.   PANCREAS: The pancreas appears unremarkable without evidence of ductal dilatation or masses.   SPLEEN: The spleen is normal in size without focal lesions.   ADRENAL GLANDS: Bilateral adrenal glands appear normal.   KIDNEYS AND URETERS: The kidneys are normal in size and enhance symmetrically. 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 There are several subcentimeter hypoattenuating lesions in the bilateral kidneys which are too small to characterize, however statistically likely represent simple renal cysts.  No hydroureteronephrosis or nephroureterolithiasis is identified.   PELVIS:   BLADDER: The urinary bladder appears normal without abnormal wall thickening.   REPRODUCTIVE ORGANS: The uterus is present. No pelvic masses.   BOWEL: The stomach is unremarkable. Postsurgical changes of partial right hemicolectomy with ileocolonic anastomosis in the right lower quadrant. Postsurgical changes of partial sigmoid colonic resection are again noted. Positive oral contrast is seen within the stomach and small bowel proximal to the level of the anastomosis. Small and large bowel are normal in caliber without wall thickening.     VESSELS: There is no aneurysmal dilatation of the abdominal aorta. There are moderate atherosclerotic  calcifications of the abdominal aorta and its branches. The IVC appears unremarkable.   PERITONEUM/RETROPERITONEUM/LYMPH NODES: There is mesenteric fat stranding in the right lower quadrant and along the anterior right perirenal fascia which is likely postsurgical. No loculated fluid collections, free fluid, or free intra-abdominal air. No abdominopelvic lymphadenopathy is present.   BONE AND SOFT TISSUE: The bone mineralization is diffusely decreased. No suspicious osseous lesions are identified. Degenerative discogenic disease is noted in the lower thoracic and lumbar spine. Post cannulated screw fixation of the left proximal femur. Postsurgical changes of the ventral midline abdominal wall.       Colon cancer restaging exam compared to 05/16/2023: 1. No evidence of metastatic disease in the chest, abdomen, or pelvis. 2. Expected postsurgical changes from right partial colectomy. 3. Stable chronic and incidental findings as detailed above.   I personally reviewed the images/study and I agree with the findings as stated by resident physician Dr. Santosh Benson.   MACRO: None   Signed by: Kulwinder Parekh 11/25/2023 1:09 PM Dictation workstation:   [id number]     Assessment:    Patient is a pleasant 71-year-old  female who presents with stage IIIB pT4a pN1a invasive moderately differentiated adenocarcinoma of descending and sigmoid colon. s/p open left colon resection, colostomy 11/2021. Metastatic carcinoma involving 1/25 LNs. Margins negative for dysplasia and tumor. Mismatch repair protein proficient.    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 Path noted to be TVA 2.3 cm, NEGATIVE FOR HIGH-GRADE DYSPLASIA AND INVASIVE  ADENOCARCINOMA.  SURGICAL RESECTION MARGINS ARE NEGATIVE FOR DYSPLASIA. TWENTY (20) BENIGN LYMPH NODES.    Plan:    Reviewed and discussed lab results with patient in detail as well as treatment options.     Continue surveillance w/CT c/a/p every 6 months     Continue to monitor CBCd, CMP, CEA     Recommend low dose Gabapentin for b/l feel neuropathy     Port removed.    C-scope in 1 year 10/2024.    Resume oral iron; continue to monitor    F/U w/PCP    RTC in 6 months    Patient verbalized understanding, and all her questions were answered to her satisfaction     30 min spent with patient greater than 50 % of which was spent in consultation, counselling and coordination of care

## 2024-02-13 ENCOUNTER — OFFICE VISIT (OUTPATIENT)
Dept: SURGERY | Facility: CLINIC | Age: 74
End: 2024-02-13
Payer: MEDICARE

## 2024-02-13 DIAGNOSIS — Z90.49 S/P PARTIAL RESECTION OF COLON: Primary | ICD-10-CM

## 2024-02-13 PROCEDURE — 99213 OFFICE O/P EST LOW 20 MIN: CPT | Performed by: SURGERY

## 2024-02-13 PROCEDURE — 1036F TOBACCO NON-USER: CPT | Performed by: SURGERY

## 2024-02-13 PROCEDURE — 1126F AMNT PAIN NOTED NONE PRSNT: CPT | Performed by: SURGERY

## 2024-02-13 NOTE — PROGRESS NOTES
General Surgery Office Follow Up    Chief Complaint:  Follow up     Interval History:  Aurelia Thayer is a 73 y.o. female who came today for follow up.   10/20/2023 open right colon resection and LENNY for ascending colon large polyp   2/2023 colostomy reversal   11/23/2021 diagnostic laparoscopy, open left colon resection, colostomy for stage IIIB pT4a pN1a invasive moderately differentiated adenocarcinoma of descending and sigmoid colon, s/p chemo   She is followed by Oncology from cancer aspect   She came today for follow up after her last surgery   Aurelia is doing  well  No complaint       Past Medical History:  Past Medical History:   Diagnosis Date    Colon cancer (CMS/HCC)     Peripheral neuropathy         Past Surgical History:  Past Surgical History:   Procedure Laterality Date    APPENDECTOMY      CHOLECYSTECTOMY      CT GUIDED PERCUTANEOUS PERITONEAL OR RETROPERITONEAL FLUID COLLECTION DRAINAGE  11/09/2021    CT GUIDED PERCUTANEOUS PERITONEAL OR RETROPERITONEAL FLUID COLLECTION DRAINAGE 11/9/2021 [facility] CLINICAL LEGACY    ECTOPIC PREGNANCY SURGERY      MEDIPORT      ORIF HIP FRACTURE      left 8/22    ORIF RADIAL SHAFT FRACTURE      OTHER SURGICAL HISTORY  12/02/2021    Colectomy subtotal    OTHER SURGICAL HISTORY  12/02/2021    Colostomy        Medications:  Current Outpatient Medications   Medication Instructions    acetaminophen (Tylenol) 500 mg tablet 2 tablets, oral, Every 6 hours PRN    cyanocobalamin (VITAMIN B-12) 250 mcg, oral, Daily    ECHINACEA ORAL 1 tablet, oral, Every other day    ferrous sulfate 65 mg, oral, Daily with breakfast, Do not crush, chew, or split.    gabapentin (NEURONTIN) 300 mg, oral, Nightly        Allergies:  Allergies   Allergen Reactions    Penicillins Rash        Family History:  Family History   Problem Relation Name Age of Onset    Heart attack Father  60 - 69        Social History:  Social History     Socioeconomic History    Marital status:      Spouse name:  Not on file    Number of children: Not on file    Years of education: Not on file    Highest education level: Not on file   Occupational History    Not on file   Tobacco Use    Smoking status: Never    Smokeless tobacco: Never   Vaping Use    Vaping Use: Never used   Substance and Sexual Activity    Alcohol use: Yes     Comment: 1 time a month    Drug use: Not on file    Sexual activity: Yes   Other Topics Concern    Not on file   Social History Narrative    Not on file     Social Determinants of Health     Financial Resource Strain: Low Risk  (10/23/2023)    Overall Financial Resource Strain (CARDIA)     Difficulty of Paying Living Expenses: Not hard at all   Food Insecurity: Unknown (10/20/2023)    Hunger Vital Sign     Worried About Running Out of Food in the Last Year: Never true     Ran Out of Food in the Last Year: Not on file   Transportation Needs: No Transportation Needs (10/23/2023)    PRAPARE - Transportation     Lack of Transportation (Medical): No     Lack of Transportation (Non-Medical): No   Physical Activity: Not on file   Stress: Stress Concern Present (10/20/2023)    [employer] [place] [employer] - Occupational Stress Questionnaire     Feeling of Stress : To some extent   Social Connections: Moderately Isolated (10/20/2023)    Social Connection and Isolation Panel [NHANES]     Frequency of Communication with Friends and Family: Never     Frequency of Social Gatherings with Friends and Family: Never     Attends [employer] Services: Never     Active Member of Clubs or Organizations: Yes     Attends Club or Organization Meetings: Never     Marital Status:    Intimate Partner Violence: Not At Risk (10/20/2023)    Humiliation, Afraid, Rape, and Kick questionnaire     Fear of Current or Ex-Partner: No     Emotionally Abused: No     Physically Abused: No     Sexually Abused: No   Housing Stability: Low Risk  (10/23/2023)    Housing Stability Vital Sign     Unable to Pay for Housing in the  Last Year: No     Number of Places Lived in the Last Year: 1     Unstable Housing in the Last Year: No        Review of Systems:  A complete 12 point review of systems was performed. It is otherwise negative except as noted in the above interval history.     Vital Signs:  There were no vitals filed for this visit.   There is no height or weight on file to calculate BMI.      Physical Exam:  General: No acute distress.   Neuro: Alert and oriented ×3. Follows commands.  Face: Atraumatic, no visible skin lesion.   Head: Atraumatic  Eyes: Pupils equal reactive to light. Extraocular motions intact.  Ears: Hears normal speaking voice.  Mouth, Nose, Throat: Mucous membranes moist.  Normal dentition.  Neck: Supple. No visible masses.  Heart/Pulse: Regular  Lung: Patent airway and no labored breath.   Vascular: Palpable radial pulses bilaterally.  Abdomen: soft, NT, ND.    Rectal and Perianal: Not examined.   Genitourinary: Not examined.   Musculoskeletal: Moves all extremities.  Normal range of motion.  Extremities: No cyanosis. No edema.   Skin: No rashes or lesions.  Psychological: Normal affect      Laboratory Values:  CBC:   Lab Results   Component Value Date    WBC 5.7 11/30/2023    RBC 3.71 (L) 11/30/2023    HGB 10.0 (L) 11/30/2023    HCT 33.3 (L) 11/30/2023     11/30/2023       RFP:   Lab Results   Component Value Date     11/30/2023    K 4.2 11/30/2023     11/30/2023    CO2 27 11/30/2023    BUN 15 11/30/2023    CREATININE 0.96 11/30/2023    CALCIUM 8.9 11/30/2023    MG 1.53 (L) 10/21/2023    PHOS 4.1 02/09/2023        LFTs:   Lab Results   Component Value Date    PROT 6.4 11/30/2023    ALBUMIN 3.9 11/30/2023    BILITOT 0.4 11/30/2023    BILIDIR 0.1 11/20/2021    ALKPHOS 75 11/30/2023    AST 15 11/30/2023    ALT 9 11/30/2023            Imaging:  I have personally reviewed the images and the radiologist's report.  No results found.  Last CT scan from last November showed no evidence of metastatic  disease      Assessment:  This is a 73 y.o. female who has   10/20/2023 open right colon resection and LENNY for ascending colon large polyp   2/2023 colostomy reversal   11/23/2021 diagnostic laparoscopy, open left colon resection, colostomy for stage IIIB pT4a pN1a invasive moderately differentiated adenocarcinoma of descending and sigmoid colon, s/p chemo   Currently doing well   Last colonoscopy with Dr Frederick in last August     Plan:  Continue follow with with Oncology   Repeat colonoscopy with Dr Frederick in October   See me back after colonoscopy   Call if any concern       Sushma Hendricks MD [facility]  General Surgery  Office: [phone]  Fax:     [phone]  11:23 AM   02/13/24

## 2024-05-30 ENCOUNTER — HOSPITAL ENCOUNTER (OUTPATIENT)
Dept: RADIOLOGY | Facility: HOSPITAL | Age: 74
Discharge: HOME | End: 2024-05-30
Payer: MEDICARE

## 2024-05-30 DIAGNOSIS — C18.9 ADENOCARCINOMA, COLON (MULTI): ICD-10-CM

## 2024-05-30 LAB
CREAT SERPL-MCNC: 0.83 MG/DL (ref 0.6–1.3)
GFR SERPL CREATININE-BSD FRML MDRD: 74 ML/MIN/1.73M*2

## 2024-05-30 PROCEDURE — A9698 NON-RAD CONTRAST MATERIALNOC: HCPCS | Performed by: PHYSICIAN ASSISTANT

## 2024-05-30 PROCEDURE — 82565 ASSAY OF CREATININE: CPT

## 2024-05-30 PROCEDURE — 2550000001 HC RX 255 CONTRASTS: Performed by: PHYSICIAN ASSISTANT

## 2024-05-30 PROCEDURE — 74177 CT ABD & PELVIS W/CONTRAST: CPT | Performed by: STUDENT IN AN ORGANIZED HEALTH CARE EDUCATION/TRAINING PROGRAM

## 2024-05-30 PROCEDURE — 71260 CT THORAX DX C+: CPT | Performed by: STUDENT IN AN ORGANIZED HEALTH CARE EDUCATION/TRAINING PROGRAM

## 2024-05-30 PROCEDURE — 74177 CT ABD & PELVIS W/CONTRAST: CPT

## 2024-05-30 RX ADMIN — IOHEXOL 500 ML: 12 SOLUTION ORAL at 10:33

## 2024-05-30 RX ADMIN — IOHEXOL 75 ML: 350 INJECTION, SOLUTION INTRAVENOUS at 10:33

## 2024-06-06 ENCOUNTER — OFFICE VISIT (OUTPATIENT)
Dept: HEMATOLOGY/ONCOLOGY | Facility: CLINIC | Age: 74
End: 2024-06-06
Payer: MEDICARE

## 2024-06-06 VITALS
BODY MASS INDEX: 28.95 KG/M2 | RESPIRATION RATE: 16 BRPM | WEIGHT: 191.91 LBS | DIASTOLIC BLOOD PRESSURE: 72 MMHG | TEMPERATURE: 97.2 F | SYSTOLIC BLOOD PRESSURE: 123 MMHG | HEART RATE: 66 BPM | OXYGEN SATURATION: 100 %

## 2024-06-06 DIAGNOSIS — G62.89 OTHER POLYNEUROPATHY: ICD-10-CM

## 2024-06-06 DIAGNOSIS — C18.9 ADENOCARCINOMA, COLON (MULTI): Primary | ICD-10-CM

## 2024-06-06 LAB
ALBUMIN SERPL BCP-MCNC: 3.9 G/DL (ref 3.4–5)
ALP SERPL-CCNC: 78 U/L (ref 33–136)
ALT SERPL W P-5'-P-CCNC: 10 U/L (ref 7–45)
ANION GAP SERPL CALC-SCNC: 11 MMOL/L (ref 10–20)
AST SERPL W P-5'-P-CCNC: 19 U/L (ref 9–39)
BASOPHILS # BLD AUTO: 0.01 X10*3/UL (ref 0–0.1)
BASOPHILS NFR BLD AUTO: 0.1 %
BILIRUB SERPL-MCNC: 0.3 MG/DL (ref 0–1.2)
BUN SERPL-MCNC: 19 MG/DL (ref 6–23)
CALCIUM SERPL-MCNC: 8.9 MG/DL (ref 8.6–10.3)
CEA SERPL-MCNC: 1.2 UG/L
CHLORIDE SERPL-SCNC: 108 MMOL/L (ref 98–107)
CO2 SERPL-SCNC: 25 MMOL/L (ref 21–32)
CREAT SERPL-MCNC: 1.11 MG/DL (ref 0.5–1.05)
EGFRCR SERPLBLD CKD-EPI 2021: 52 ML/MIN/1.73M*2
EOSINOPHIL # BLD AUTO: 0.12 X10*3/UL (ref 0–0.4)
EOSINOPHIL NFR BLD AUTO: 1.7 %
ERYTHROCYTE [DISTWIDTH] IN BLOOD BY AUTOMATED COUNT: 14.8 % (ref 11.5–14.5)
FERRITIN SERPL-MCNC: 141 NG/ML (ref 8–150)
GLUCOSE SERPL-MCNC: 95 MG/DL (ref 74–99)
HCT VFR BLD AUTO: 37.2 % (ref 36–46)
HGB BLD-MCNC: 11.4 G/DL (ref 12–16)
IMM GRANULOCYTES # BLD AUTO: 0 X10*3/UL (ref 0–0.5)
IMM GRANULOCYTES NFR BLD AUTO: 0 % (ref 0–0.9)
IRON SATN MFR SERPL: 14 % (ref 25–45)
IRON SERPL-MCNC: 41 UG/DL (ref 35–150)
LYMPHOCYTES # BLD AUTO: 1.97 X10*3/UL (ref 0.8–3)
LYMPHOCYTES NFR BLD AUTO: 28.6 %
MCH RBC QN AUTO: 27.6 PG (ref 26–34)
MCHC RBC AUTO-ENTMCNC: 30.6 G/DL (ref 32–36)
MCV RBC AUTO: 90 FL (ref 80–100)
MONOCYTES # BLD AUTO: 0.53 X10*3/UL (ref 0.05–0.8)
MONOCYTES NFR BLD AUTO: 7.7 %
NEUTROPHILS # BLD AUTO: 4.27 X10*3/UL (ref 1.6–5.5)
NEUTROPHILS NFR BLD AUTO: 61.9 %
PLATELET # BLD AUTO: 206 X10*3/UL (ref 150–450)
POTASSIUM SERPL-SCNC: 4.6 MMOL/L (ref 3.5–5.3)
PROT SERPL-MCNC: 6.6 G/DL (ref 6.4–8.2)
RBC # BLD AUTO: 4.13 X10*6/UL (ref 4–5.2)
SODIUM SERPL-SCNC: 139 MMOL/L (ref 136–145)
TIBC SERPL-MCNC: 288 UG/DL (ref 240–445)
UIBC SERPL-MCNC: 247 UG/DL (ref 110–370)
WBC # BLD AUTO: 6.9 X10*3/UL (ref 4.4–11.3)

## 2024-06-06 PROCEDURE — 80053 COMPREHEN METABOLIC PANEL: CPT | Performed by: PHYSICIAN ASSISTANT

## 2024-06-06 PROCEDURE — 1159F MED LIST DOCD IN RCRD: CPT | Performed by: PHYSICIAN ASSISTANT

## 2024-06-06 PROCEDURE — 99214 OFFICE O/P EST MOD 30 MIN: CPT | Performed by: PHYSICIAN ASSISTANT

## 2024-06-06 PROCEDURE — 36415 COLL VENOUS BLD VENIPUNCTURE: CPT | Performed by: PHYSICIAN ASSISTANT

## 2024-06-06 PROCEDURE — 85025 COMPLETE CBC W/AUTO DIFF WBC: CPT | Performed by: PHYSICIAN ASSISTANT

## 2024-06-06 PROCEDURE — 1036F TOBACCO NON-USER: CPT | Performed by: PHYSICIAN ASSISTANT

## 2024-06-06 PROCEDURE — 82378 CARCINOEMBRYONIC ANTIGEN: CPT | Mod: GEALAB | Performed by: PHYSICIAN ASSISTANT

## 2024-06-06 PROCEDURE — 82728 ASSAY OF FERRITIN: CPT | Performed by: PHYSICIAN ASSISTANT

## 2024-06-06 PROCEDURE — 1126F AMNT PAIN NOTED NONE PRSNT: CPT | Performed by: PHYSICIAN ASSISTANT

## 2024-06-06 PROCEDURE — 83540 ASSAY OF IRON: CPT | Performed by: PHYSICIAN ASSISTANT

## 2024-06-06 RX ORDER — GABAPENTIN 300 MG/1
300 CAPSULE ORAL NIGHTLY
Qty: 30 CAPSULE | Refills: 6 | Status: SHIPPED | OUTPATIENT
Start: 2024-06-06 | End: 2025-06-06

## 2024-06-06 ASSESSMENT — PAIN SCALES - GENERAL: PAINLEVEL: 0-NO PAIN

## 2024-06-06 NOTE — PROGRESS NOTES
Patient Visit Information:   Visit Type: Follow Up Visit     Cancer History:   Treatment Synopsis:    Patient is a pleasant 71-year-old  female who was referred by Dr. Hendricks for evaluation and management of recently diagnosed colon cancer.    Patient presented to urgent care on 11/8/2021 for fatigue/weakness, low-grade fever. Noted to have significant LLQ tenderness and was sent to ED for further evaluation. CT abd/pelv obtained at that time revealed incompletely formed abscess/bowel perforation in the LLQ, likely secondary to diverticulitis. She was admitted for abd abscess, syncope, acute diverticulitis, and UTI. She had drain placed in diverticular abscess on 11/9/2021 w/ continuation of IV abx. Completed 10 day course of moxifloxacin for UTI. Of note ECHO was unremarkable.    She returned to the ED on 11/20/2021 for worsening abd pain. CT abd/pelv at that time showed new pigtail catheter in the region of a now near completely resolved abscess/bowel perforation. Focal colonic luminal narrowing, possibly stricture secondary to scarring, at the recent inflammatory site, with proximal colon dilation and moderate-sized stool burden, raising the possibility of colon obstruction. Therefore she was admitted and repeat CT abd/pelv on 11/22/2021 revealed pigtail catheter within the left lower quadrant without residual collection seen. Associated stable appearing wall thickening and reticulation adjacent to the colon within this region appears stable since comparison CT 11/20/2021. New region of mild wall thickening of the proximal transverse colon which may indicate developing colitis. Remainder findings appear stable since comparison CT abdomen pelvis 11/20/2021.    She underwent diagnostic laparoscopy, open left colon resection, colostomy, splenic flexure mobilization, and extensive lysis of adhesions on 11/23/2021. Pathology revealed invasive moderately differentiated adenocarcinoma w/ perforation and abscess  formation. Metastatic carcinoma involving 1/25 LNs. Margins negative for dysplasia and tumor. Omentum w/ fat necrosis, acute and chronic inflammation; negative for tumor. Normal mismatch repair protein expression.    Patient recovered from surgery, reports increased flatulence. She reports ~30 lbs weight loss over the last 6 months.    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 Benign haptic and renal cysts and right renal angiomylipoma. No metastatic disease to the chest. Progressive chronic interstitial changes, could be related to post chemotherapy.     Patient presents today for follow up. completed FOLFOX 7/27/2022. Oxaliplatin was held since cycle 8 of FOLFOX, pt tolerated much better; cycle 9 of FOLFOX was held during last treatment due to neutropenia.     c/o numbness of fingers and toes.  she reports that she feeling good. good energy and appetite. fever, chills, night sweats, abdominal pain, CP, SOB, and bleeding.    In 2/2023 had reversal of colostomy.     S/P  open right colon resection and LENNY for ascending colon large polyp on 10/20/2023. Path noted to be TVA 2.3 cm, NEGATIVE FOR HIGH-GRADE DYSPLASIA AND INVASIVE ADENOCARCINOMA.  SURGICAL RESECTION MARGINS ARE NEGATIVE FOR DYSPLASIA. TWENTY (20) BENIGN LYMPH NODES.    Past medical history: colon cancer as above, diverticulitis  Past surgical history: colectomy subtotal, colostomy, cholecystectomy, appendectomy  Family history: Denies family history of cancer or blood disorders.     History of Present Illness:   Patient presents for follow up accompanied by her . States that she feel well. On Gabapentin and helping with neuropathy in feet. Stopped jose oral iron but will resume again.     Review of Systems:    All of the systems have been reviewed and are negative for complaints except what is stated in the HPI and/or past  "medical history       Allergies and Intolerances:       Allergies:   penicillins: Drug Category, Anaphylaxis, Active    Outpatient Medication Profile:  Current Outpatient Medications on File Prior to Visit   Medication Sig Dispense Refill    acetaminophen (Tylenol) 500 mg tablet Take 2 tablets (1,000 mg) by mouth every 6 hours if needed.      cyanocobalamin (Vitamin B-12) 250 mcg tablet Take 1 tablet (250 mcg) by mouth once daily.      ECHINACEA ORAL Take 1 tablet by mouth every other day.      ferrous sulfate 325 (65 Fe) MG EC tablet Take 65 mg by mouth once daily with a meal. Do not crush, chew, or split.      [DISCONTINUED] gabapentin (Neurontin) 300 mg capsule Take 1 capsule (300 mg) by mouth once daily at bedtime. 30 capsule 6     No current facility-administered medications on file prior to visit.         10/24/2023    12:00 AM 10/24/2023     4:00 AM 10/24/2023     9:50 AM 10/31/2023     9:59 AM 11/14/2023    10:05 AM 11/30/2023     9:20 AM 6/6/2024     9:32 AM   Vitals   Systolic 121 128 141 131 120 128 123   Diastolic 65 71 63 71 68 75 72   Heart Rate 94 89 93 104 87 70 66   Temp 36 °C (96.8 °F) 36.2 °C (97.2 °F) 36.2 °C (97.2 °F)   36.7 °C (98.1 °F) 36.2 °C (97.2 °F)   Resp 18 16 18   18 16   Height (in)    1.778 m (5' 10\") 1.778 m (5' 10\") 1.734 m (5' 8.27\")     Weight (lb)    178.2 173.9 173.94 191.91   BMI    25.57 kg/m2 24.95 kg/m2 26.24 kg/m2 28.95 kg/m2   BSA (m2)    2 m2 1.97 m2 1.95 m2 2.05 m2   Visit Report    Report Report Report Report       Significant value     Physical Exam:     Constitutional: Awake/alert/oriented x3, no distress, alert and cooperative   Eyes: PERRL, EOMI, clear sclera   ENMT: Mucous membranes moist   Head/Neck: Neck supple, no apparent injury, thyroid without mass or tenderness. No JVD, trachea midline, no bruits   Respiratory/Thorax: Patent airways, CTAB   Cardiovascular: Regular rate and rhythm   Gastrointestinal: Nondistended, soft, non-tender   Psychological: Appropriate " mood and behavior   Skin: Dry, no lesions, no rashes     Office Visit on 06/06/2024   Component Date Value Ref Range Status    WBC 06/06/2024 6.9  4.4 - 11.3 x10*3/uL Final    RBC 06/06/2024 4.13  4.00 - 5.20 x10*6/uL Final    Hemoglobin 06/06/2024 11.4 (L)  12.0 - 16.0 g/dL Final    Hematocrit 06/06/2024 37.2  36.0 - 46.0 % Final    MCV 06/06/2024 90  80 - 100 fL Final    MCH 06/06/2024 27.6  26.0 - 34.0 pg Final    MCHC 06/06/2024 30.6 (L)  32.0 - 36.0 g/dL Final    RDW 06/06/2024 14.8 (H)  11.5 - 14.5 % Final    Platelets 06/06/2024 206  150 - 450 x10*3/uL Final    Neutrophils % 06/06/2024 61.9  40.0 - 80.0 % Final    Immature Granulocytes %, Automated 06/06/2024 0.0  0.0 - 0.9 % Final    Immature Granulocyte Count (IG) includes promyelocytes, myelocytes and metamyelocytes but does not include bands. Percent differential counts (%) should be interpreted in the context of the absolute cell counts (cells/UL).    Lymphocytes % 06/06/2024 28.6  13.0 - 44.0 % Final    Monocytes % 06/06/2024 7.7  2.0 - 10.0 % Final    Eosinophils % 06/06/2024 1.7  0.0 - 6.0 % Final    Basophils % 06/06/2024 0.1  0.0 - 2.0 % Final    Neutrophils Absolute 06/06/2024 4.27  1.60 - 5.50 x10*3/uL Final    Percent differential counts (%) should be interpreted in the context of the absolute cell counts (cells/uL).    Immature Granulocytes Absolute, Au* 06/06/2024 0.00  0.00 - 0.50 x10*3/uL Final    Lymphocytes Absolute 06/06/2024 1.97  0.80 - 3.00 x10*3/uL Final    Monocytes Absolute 06/06/2024 0.53  0.05 - 0.80 x10*3/uL Final    Eosinophils Absolute 06/06/2024 0.12  0.00 - 0.40 x10*3/uL Final    Basophils Absolute 06/06/2024 0.01  0.00 - 0.10 x10*3/uL Final   Hospital Outpatient Visit on 05/30/2024   Component Date Value Ref Range Status    POCT Creatinine 05/30/2024 0.83  0.60 - 1.30 mg/dL Final    Hydroxyurea can cause significant interference with creatinine measurement using the i-STAT device. An alternate method of creatinine measurement  must be used in patients treated with hydroxyurea.    POCT eGFR 05/30/2024 74  >=60 mL/min/1.73m*2 Final    Calculations of estimated GFR are performed using the 2021 CKD-EPI Study Refit  equation without the race variable for the IDMS-Traceable Creatinine Methods.     https://jasn.asnjournals.org/content/early/2021/09/22/ASN.6116490211     CT chest abdomen pelvis w IV contrast    Result Date: 5/31/2024  Interpreted By:  Jeff Ray, STUDY: CT CHEST ABDOMEN PELVIS W IV CONTRAST;  5/30/2024 10:29 am   INDICATION: Signs/Symptoms:colon cancer restaging.   COMPARISON: Chest and abdomen CT scan 11/24/2023.   ACCESSION NUMBER(S): [id number]   ORDERING CLINICIAN: MEGAN PARKER   TECHNIQUE: CT of the chest, abdomen, and pelvis was performed.  Contiguous axial images were obtained at 3 mm slice thickness through the chest, abdomen and pelvis. Coronal and sagittal reconstructions at 3 mm slice thickness were performed. 75 ml of contrast Omnipaque were administered intravenously without immediate complication.   FINDINGS: CHEST:   LUNG/PLEURA/LARGE AIRWAYS: Stable bilateral lower lobe posterior subpleural reticular opacities. Bibasal atelectasis. No suspicious pulmonary nodules. No major pulmonary consolidation, pleural effusion or pneumothorax. Central airways are patent.   VESSELS: Aorta and pulmonary arteries are normal caliber.  Mild atherosclerotic changes are noted of the aorta and branching vessels. Mild coronary artery calcifications are present.   HEART: The heart is normal in size.  Trace pericardial effusion   MEDIASTINUM AND LANDON: No mediastinal, hilar or axillary lymphadenopathy is present.  The esophagus is normal.   CHEST WALL AND LOWER NECK: The soft tissues of the chest wall demonstrate no gross abnormality. The visualized thyroid gland appears within normal limits.   ABDOMEN:   LIVER: Normal size. Normal contour. Normal density. Stable segment 8 lobulated cyst measuring 4.7 cm and another smaller segment 2  cyst measuring 0.8 cm.   BILE DUCTS: The intrahepatic and extrahepatic ducts are not dilated.   GALLBLADDER: The gallbladder is surgically absent.   PANCREAS: The pancreas appears unremarkable without evidence of ductal dilatation or masses.   SPLEEN: The spleen is normal in size without focal lesions.   ADRENAL GLANDS: Stable 0.9 cm right adrenal nodule.   KIDNEYS AND URETERS: The kidneys are normal in size and enhance symmetrically.  No hydroureteronephrosis or nephroureterolithiasis is identified. Bilateral cortical cysts.   PELVIS:   BLADDER: Within normal limits.   REPRODUCTIVE ORGANS: Unremarkable uterus   BOWEL: Status post right hemicolectomy/ileocolonic anastomosis and left partial colectomies with no gross soft tissue mass at the surgical bed. Uncomplicated colonic diverticulosis. No ascites. No pneumoperitoneum.     VESSELS: Mild vascular calcifications and atherosclerotic changes.   PERITONEUM/RETROPERITONEUM/LYMPH NODES: Few tiny nonspecific perirectal lymph nodes noted. No new enlarged intraabdominal or pelvic lymphadenopathy   BONE AND SOFT TISSUE: Mild multilevel degenerative spine changes and facet joint disease. Prior left proximal femur fixation. Prior anterior abdominal wall laparotomy incision       CHEST: 1. No intrathoracic metastatic disease. 2. Trace pericardial effusion.   ABDOMEN-PELVIS: 1. Status post prior right hemicolectomy with ileocolonic anastomosis and left colectomy with no definite locoregional recurrence or metastatic disease in the abdomen or pelvis. 2. Stable few tiny nonspecific perirectal lymph nodes noted. For continued attention on follow-up. 3. Other chronic and ancillary findings are unchanged.     MACRO: None   Signed by: Jeff Ray 5/31/2024 8:29 AM Dictation workstation:   [id number]     Assessment:    Patient is a pleasant 71-year-old  female who presents with stage IIIB pT4a pN1a invasive moderately differentiated adenocarcinoma of descending and  sigmoid colon. s/p open left colon resection, colostomy 11/2021. Metastatic carcinoma involving 1/25 LNs. Margins negative for dysplasia and tumor. Mismatch repair protein proficient.    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 Path noted to be TVA 2.3 cm, NEGATIVE FOR HIGH-GRADE DYSPLASIA AND INVASIVE ADENOCARCINOMA.  SURGICAL RESECTION MARGINS ARE NEGATIVE FOR DYSPLASIA. TWENTY (20) BENIGN LYMPH NODES.    Plan:    Reviewed and discussed lab results with patient in detail as well as treatment options.     Continue surveillance w/CT c/a/p every 6 months     Continue to monitor CBCd, CMP, CEA     Continue low dose Gabapentin for b/l feel neuropathy     C-scope in 1 year 10/2024.    Resume oral iron; continue to monitor    F/U w/PCP    RTC in 6 months    Patient verbalized understanding, and all her questions were answered to her satisfaction     30 min spent with patient greater than 50 % of which was spent in consultation, counselling and coordination of care

## 2024-07-30 ENCOUNTER — TELEPHONE (OUTPATIENT)
Dept: HEMATOLOGY/ONCOLOGY | Facility: CLINIC | Age: 74
End: 2024-07-30
Payer: MEDICARE

## 2024-07-30 NOTE — TELEPHONE ENCOUNTER
Called and spoke to pt.  Discussed use of PIN number, which she knew, but her  didn't remember.    Pt told she has 6 refills on her Gabapentin and she can call [facility] and have them refill her script.  Pt verbalized understanding and states she will call pharmacy.

## 2024-09-03 DIAGNOSIS — D12.6 TUBULOVILLOUS ADENOMA OF COLON: Primary | ICD-10-CM

## 2024-09-03 RX ORDER — POLYETHYLENE GLYCOL 3350, SODIUM SULFATE ANHYDROUS, SODIUM BICARBONATE, SODIUM CHLORIDE, POTASSIUM CHLORIDE 236; 22.74; 6.74; 5.86; 2.97 G/4L; G/4L; G/4L; G/4L; G/4L
4000 POWDER, FOR SOLUTION ORAL ONCE
Qty: 4000 ML | Refills: 0 | Status: SHIPPED | OUTPATIENT
Start: 2024-09-03 | End: 2024-09-03

## 2024-09-12 ENCOUNTER — TELEPHONE (OUTPATIENT)
Dept: PREADMISSION TESTING | Facility: HOSPITAL | Age: 74
End: 2024-09-12
Payer: MEDICARE

## 2024-09-12 NOTE — TELEPHONE ENCOUNTER
SURGERY PRE-OPERATIVE INSTRUCTIONS    Patient should not take her supplements on the day of the procedure.  Pt is able to take her gabapentin the night before.    *You will receive a phone call the day before your procedure  after 2pm, (or the Friday before your surgery if scheduled on a Monday.) Generally the hospital will be calling you with this information after that time.    *You are not to eat after midnight the night before the surgery. You may have 8oz of a clear liquid up until 2 hours prior to arriving to the hospital. The exception is with medications you were instructed to take day of surgery.    *You may take tylenol for pain/discomfort as needed.     *Stop taking all aspirin products, ibuprofen (motrin/advil), naproxen (aleve/naprosyn) for one week prior to surgery.    *Stop taking all vitamins and supplements one week prior to surgery.     *You should not have alcoholic beverages for 24 hours before surgery.     *You should not smoke 24 hours prior to surgery.     *To help prevent surgical infections bathe/shower with Dial soap the evening before surgery.    *You can wear deodorant but no lotion, powder, or perfume/cologne. You should remove all make-up and nail polish at home.    *If you wear glasses, please bring a case for the glasses with you.    *You will be asked to remove dentures and contacts.     *Please leave all valuables at home.    *You should wear loose, comfortable clothing that will accommodate bandages and/or casts.    *You should notify your doctor of any change in your condition (fever, cold, rash, etc). Surgery may need to be re-scheduled until a time you are in better health.    *A responsible adult is required to accompany you to and from the hospital if you are receiving anesthesia or a sedative. Patients are not permitted to drive for 24 hours after anesthesia.     *You can use the [employer] parking if you wish.     *If you have any further questions please call [facility] [phone].

## 2024-09-16 ENCOUNTER — ANESTHESIA (OUTPATIENT)
Dept: OPERATING ROOM | Facility: HOSPITAL | Age: 74
End: 2024-09-16
Payer: MEDICARE

## 2024-09-16 ENCOUNTER — ANESTHESIA EVENT (OUTPATIENT)
Dept: OPERATING ROOM | Facility: HOSPITAL | Age: 74
End: 2024-09-16
Payer: MEDICARE

## 2024-09-16 ENCOUNTER — HOSPITAL ENCOUNTER (OUTPATIENT)
Dept: OPERATING ROOM | Facility: HOSPITAL | Age: 74
Setting detail: OUTPATIENT SURGERY
Discharge: HOME | End: 2024-09-16
Payer: MEDICARE

## 2024-09-16 VITALS
DIASTOLIC BLOOD PRESSURE: 79 MMHG | OXYGEN SATURATION: 100 % | RESPIRATION RATE: 16 BRPM | BODY MASS INDEX: 27.76 KG/M2 | HEIGHT: 69 IN | TEMPERATURE: 96.8 F | WEIGHT: 187.39 LBS | SYSTOLIC BLOOD PRESSURE: 120 MMHG | HEART RATE: 74 BPM

## 2024-09-16 DIAGNOSIS — D12.6 TUBULOVILLOUS ADENOMA OF COLON: ICD-10-CM

## 2024-09-16 PROCEDURE — 3700000002 HC GENERAL ANESTHESIA TIME - EACH INCREMENTAL 1 MINUTE: Performed by: STUDENT IN AN ORGANIZED HEALTH CARE EDUCATION/TRAINING PROGRAM

## 2024-09-16 PROCEDURE — 99100 ANES PT EXTEME AGE<1 YR&>70: CPT | Performed by: STUDENT IN AN ORGANIZED HEALTH CARE EDUCATION/TRAINING PROGRAM

## 2024-09-16 PROCEDURE — A45378 PR COLONOSCOPY,DIAGNOSTIC: Performed by: NURSE ANESTHETIST, CERTIFIED REGISTERED

## 2024-09-16 PROCEDURE — 3600000002 HC OR TIME - INITIAL BASE CHARGE - PROCEDURE LEVEL TWO: Performed by: STUDENT IN AN ORGANIZED HEALTH CARE EDUCATION/TRAINING PROGRAM

## 2024-09-16 PROCEDURE — 3600000007 HC OR TIME - EACH INCREMENTAL 1 MINUTE - PROCEDURE LEVEL TWO: Performed by: STUDENT IN AN ORGANIZED HEALTH CARE EDUCATION/TRAINING PROGRAM

## 2024-09-16 PROCEDURE — 7100000010 HC PHASE TWO TIME - EACH INCREMENTAL 1 MINUTE: Performed by: STUDENT IN AN ORGANIZED HEALTH CARE EDUCATION/TRAINING PROGRAM

## 2024-09-16 PROCEDURE — 2500000004 HC RX 250 GENERAL PHARMACY W/ HCPCS (ALT 636 FOR OP/ED): Performed by: NURSE ANESTHETIST, CERTIFIED REGISTERED

## 2024-09-16 PROCEDURE — A45378 PR COLONOSCOPY,DIAGNOSTIC: Performed by: STUDENT IN AN ORGANIZED HEALTH CARE EDUCATION/TRAINING PROGRAM

## 2024-09-16 PROCEDURE — G0105 COLORECTAL SCRN; HI RISK IND: HCPCS | Performed by: INTERNAL MEDICINE

## 2024-09-16 PROCEDURE — 3700000001 HC GENERAL ANESTHESIA TIME - INITIAL BASE CHARGE: Performed by: STUDENT IN AN ORGANIZED HEALTH CARE EDUCATION/TRAINING PROGRAM

## 2024-09-16 PROCEDURE — 7100000009 HC PHASE TWO TIME - INITIAL BASE CHARGE: Performed by: STUDENT IN AN ORGANIZED HEALTH CARE EDUCATION/TRAINING PROGRAM

## 2024-09-16 PROCEDURE — 2500000005 HC RX 250 GENERAL PHARMACY W/O HCPCS: Performed by: NURSE ANESTHETIST, CERTIFIED REGISTERED

## 2024-09-16 PROCEDURE — 2500000004 HC RX 250 GENERAL PHARMACY W/ HCPCS (ALT 636 FOR OP/ED): Performed by: STUDENT IN AN ORGANIZED HEALTH CARE EDUCATION/TRAINING PROGRAM

## 2024-09-16 RX ORDER — DROPERIDOL 2.5 MG/ML
0.62 INJECTION, SOLUTION INTRAMUSCULAR; INTRAVENOUS ONCE AS NEEDED
Status: DISCONTINUED | OUTPATIENT
Start: 2024-09-16 | End: 2024-09-17 | Stop reason: HOSPADM

## 2024-09-16 RX ORDER — SODIUM CHLORIDE, SODIUM LACTATE, POTASSIUM CHLORIDE, CALCIUM CHLORIDE 600; 310; 30; 20 MG/100ML; MG/100ML; MG/100ML; MG/100ML
20 INJECTION, SOLUTION INTRAVENOUS CONTINUOUS
Status: DISCONTINUED | OUTPATIENT
Start: 2024-09-16 | End: 2024-09-17 | Stop reason: HOSPADM

## 2024-09-16 RX ORDER — ONDANSETRON HYDROCHLORIDE 2 MG/ML
4 INJECTION, SOLUTION INTRAVENOUS ONCE AS NEEDED
Status: DISCONTINUED | OUTPATIENT
Start: 2024-09-16 | End: 2024-09-17 | Stop reason: HOSPADM

## 2024-09-16 RX ORDER — LIDOCAINE HYDROCHLORIDE 10 MG/ML
0.1 INJECTION, SOLUTION EPIDURAL; INFILTRATION; INTRACAUDAL; PERINEURAL ONCE
Status: DISCONTINUED | OUTPATIENT
Start: 2024-09-16 | End: 2024-09-17 | Stop reason: HOSPADM

## 2024-09-16 RX ORDER — LIDOCAINE HYDROCHLORIDE 10 MG/ML
INJECTION, SOLUTION EPIDURAL; INFILTRATION; INTRACAUDAL; PERINEURAL AS NEEDED
Status: DISCONTINUED | OUTPATIENT
Start: 2024-09-16 | End: 2024-09-16

## 2024-09-16 RX ORDER — SODIUM CHLORIDE, SODIUM LACTATE, POTASSIUM CHLORIDE, CALCIUM CHLORIDE 600; 310; 30; 20 MG/100ML; MG/100ML; MG/100ML; MG/100ML
100 INJECTION, SOLUTION INTRAVENOUS CONTINUOUS
Status: DISCONTINUED | OUTPATIENT
Start: 2024-09-16 | End: 2024-09-17 | Stop reason: HOSPADM

## 2024-09-16 RX ORDER — PROPOFOL 10 MG/ML
INJECTION, EMULSION INTRAVENOUS AS NEEDED
Status: DISCONTINUED | OUTPATIENT
Start: 2024-09-16 | End: 2024-09-16

## 2024-09-16 RX ORDER — ACETAMINOPHEN 325 MG/1
325 TABLET ORAL EVERY 6 HOURS PRN
COMMUNITY

## 2024-09-16 SDOH — HEALTH STABILITY: MENTAL HEALTH: CURRENT SMOKER: 0

## 2024-09-16 ASSESSMENT — PAIN SCALES - GENERAL
PAIN_LEVEL: 0
PAINLEVEL_OUTOF10: 0 - NO PAIN
PAINLEVEL_OUTOF10: 0 - NO PAIN

## 2024-09-16 ASSESSMENT — PAIN - FUNCTIONAL ASSESSMENT
PAIN_FUNCTIONAL_ASSESSMENT: 0-10
PAIN_FUNCTIONAL_ASSESSMENT: 0-10

## 2024-09-16 NOTE — ANESTHESIA PREPROCEDURE EVALUATION
Patient: Aurelia Thayer    Procedure Information       Date/Time: 09/16/24 1150    Scheduled providers: Doug Frederick MD; Anna James MD    Procedure: COLONOSCOPY    Location: [facility] OR            Relevant Problems   Neuro   (+) Peripheral neuropathy      GI   (+) Adenocarcinoma, colon (Multi)      Liver   (+) Adenocarcinoma, colon (Multi)      HEENT   (+) Vision loss       Clinical information reviewed:   Tobacco  Allergies  Meds   Med Hx  Surg Hx  OB Status  Fam Hx  Soc   Hx        NPO Detail:  NPO/Void Status  Carbohydrate Drink Given Prior to Surgery? : N  Date of Last Liquid: 09/16/24  Time of Last Liquid: 2300  Date of Last Solid: 09/14/24  Time of Last Solid: 2300  Last Intake Type: Clear fluids  Time of Last Void: 1000         Physical Exam    Airway  Mallampati: I  TM distance: >3 FB  Neck ROM: full     Cardiovascular - normal exam  Rhythm: regular  Rate: normal     Dental     Comments: Poor dentition   Pulmonary - normal exam     Abdominal - normal exam             Anesthesia Plan    History of general anesthesia?: yes  History of complications of general anesthesia?: no    ASA 3     MAC     The patient is not a current smoker.  Patient was previously instructed to abstain from smoking on day of procedure.  Patient did not smoke on day of procedure.    intravenous induction   Anesthetic plan and risks discussed with patient.  Use of blood products discussed with patient who consented to blood products.    Plan discussed with attending.

## 2024-09-16 NOTE — DISCHARGE INSTRUCTIONS
Patient Instructions after a Colonoscopy      The anesthetics, sedatives or narcotics which were given to you today will be acting in your body for the next 24 hours, so you might feel a little sleepy or groggy.  This feeling should slowly wear off. Carefully read and follow the instructions.     You received sedation today:  - Do not drive or operate any machinery or power tools of any kind.   - No alcoholic beverages today, not even beer or wine.  - Do not make any important decisions or sign any legal documents.  - No over the counter medications that contain alcohol or that may cause drowsiness.  - Do not make any important decisions or sign any legal documents.  - Make sure you have someone with you for first 24 hours.    While it is common to experience mild to moderate abdominal distention, gas, or belching after your procedure, if any of these symptoms occur following discharge from the GI Lab or within one week of having your procedure, call the [facility] to be advised whether a visit to your nearest Urgent Care or Emergency Department is indicated.  Take this paper with you if you go.     - If you develop an allergic reaction to the medications that were given during your procedure such as difficulty breathing, rash, hives, severe nausea, vomiting or lightheadedness.  - If you experience chest pain, shortness of breath, severe abdominal pain, fevers and chills.  -If you develop signs and symptoms of bleeding such as blood in your spit, if your stools turn black, tarry, or bloody  - If you have not urinated within 8 hours following your procedure.  - If your IV site becomes painful, red, inflamed, or looks infected.    If you received a biopsy/polypectomy/sphincterotomy the following instructions apply below:    __ Do not use Aspirin containing products, non-steroidal medications or anti-coagulants for one week following your procedure. (Examples of these types of medications are: Advil,  Arthrotec, Aleve, Coumadin, Ecotrin, Heparin, Ibuprofen, Indocin, Motrin, Naprosyn, Nuprin, Plavix, Vioxx, and Voltarin, or their generic forms.  This list is not all-inclusive.  Check with your physician or pharmacist before resuming medications.)   __ Eat a soft diet today.  Avoid foods that are poorly digested for the next 24 hours.  These foods would include: nuts, beans, lettuce, red meats, and fried foods. Start with liquids and advance your diet as tolerated, gradually work up to eating solids.   __ Do not have a Barium Study or Enema for one week.    Your physician recommends the additional following instructions:    -You have a contact number available for emergencies. The signs and symptoms of potential delayed complications were discussed with you. You may return to normal activities tomorrow.  -Resume your previous diet.  -Continue your present medications.   -We are waiting for your pathology results.  -Your physician has recommended a repeat colonoscopy (date to be determined after pending pathology results are reviewed) for surveillance based on pathology results.  -The findings and recommendations have been discussed with you.  -The findings and recommendations were discussed with your family.  - Please see Medication Reconciliation Form for new medication/medications prescribed.       If you experience any problems or have any questions following discharge from the GI Lab, please call:        Nurse Signature                                                                        Date___________________                                                                            Patient/Responsible Party Signature                                        Date___________________

## 2024-09-16 NOTE — ANESTHESIA POSTPROCEDURE EVALUATION
Patient: Aurelia Thayer    Procedure Summary       Date: 09/16/24 Room / Location: [facility] OR    Anesthesia Start: 1128 Anesthesia Stop: 1154    Procedure: COLONOSCOPY Diagnosis: Tubulovillous adenoma of colon    Scheduled Providers: Doug Frederick MD; Anna James MD Responsible Provider: Anna James MD    Anesthesia Type: MAC ASA Status: 3            Anesthesia Type: MAC    Vitals Value Taken Time   /79 09/16/24 1206   Temp 36 °C (96.8 °F) 09/16/24 1151   Pulse 74 09/16/24 1206   Resp 16 09/16/24 1206   SpO2 100 % 09/16/24 1206       Anesthesia Post Evaluation    Patient location during evaluation: PACU  Patient participation: complete - patient participated  Level of consciousness: awake and alert  Pain score: 0  Pain management: adequate  Airway patency: patent  Cardiovascular status: acceptable  Respiratory status: acceptable  Hydration status: acceptable  Postoperative Nausea and Vomiting: none        No notable events documented.

## 2024-12-12 ENCOUNTER — HOSPITAL ENCOUNTER (OUTPATIENT)
Dept: RADIOLOGY | Facility: HOSPITAL | Age: 74
Discharge: HOME | End: 2024-12-12
Payer: MEDICARE

## 2024-12-12 DIAGNOSIS — C18.9 ADENOCARCINOMA, COLON (MULTI): ICD-10-CM

## 2024-12-12 LAB
CREAT SERPL-MCNC: 0.61 MG/DL (ref 0.6–1.3)
GFR SERPL CREATININE-BSD FRML MDRD: >90 ML/MIN/1.73M*2

## 2024-12-12 PROCEDURE — 74177 CT ABD & PELVIS W/CONTRAST: CPT | Performed by: RADIOLOGY

## 2024-12-12 PROCEDURE — 82565 ASSAY OF CREATININE: CPT

## 2024-12-12 PROCEDURE — 74177 CT ABD & PELVIS W/CONTRAST: CPT

## 2024-12-12 PROCEDURE — 2550000001 HC RX 255 CONTRASTS: Performed by: PHYSICIAN ASSISTANT

## 2024-12-12 PROCEDURE — 71260 CT THORAX DX C+: CPT | Performed by: RADIOLOGY

## 2024-12-12 RX ORDER — DIATRIZOATE MEGLUMINE AND DIATRIZOATE SODIUM 660; 100 MG/ML; MG/ML
30 SOLUTION ORAL; RECTAL ONCE
Status: COMPLETED | OUTPATIENT
Start: 2024-12-12 | End: 2024-12-12

## 2024-12-19 ENCOUNTER — OFFICE VISIT (OUTPATIENT)
Dept: HEMATOLOGY/ONCOLOGY | Facility: CLINIC | Age: 74
End: 2024-12-19
Payer: MEDICARE

## 2024-12-19 VITALS
DIASTOLIC BLOOD PRESSURE: 71 MMHG | TEMPERATURE: 96.8 F | BODY MASS INDEX: 28.73 KG/M2 | HEART RATE: 75 BPM | RESPIRATION RATE: 16 BRPM | HEIGHT: 69 IN | OXYGEN SATURATION: 100 % | WEIGHT: 194 LBS | SYSTOLIC BLOOD PRESSURE: 119 MMHG

## 2024-12-19 DIAGNOSIS — D50.9 IRON DEFICIENCY ANEMIA, UNSPECIFIED IRON DEFICIENCY ANEMIA TYPE: ICD-10-CM

## 2024-12-19 DIAGNOSIS — C18.9 ADENOCARCINOMA, COLON (MULTI): Primary | ICD-10-CM

## 2024-12-19 LAB
ALBUMIN SERPL BCP-MCNC: 4.2 G/DL (ref 3.4–5)
ALP SERPL-CCNC: 81 U/L (ref 33–136)
ALT SERPL W P-5'-P-CCNC: 15 U/L (ref 7–45)
ANION GAP SERPL CALC-SCNC: 13 MMOL/L (ref 10–20)
AST SERPL W P-5'-P-CCNC: 19 U/L (ref 9–39)
BASOPHILS # BLD AUTO: 0.04 X10*3/UL (ref 0–0.1)
BASOPHILS NFR BLD AUTO: 0.6 %
BILIRUB SERPL-MCNC: 0.5 MG/DL (ref 0–1.2)
BUN SERPL-MCNC: 19 MG/DL (ref 6–23)
CALCIUM SERPL-MCNC: 9.3 MG/DL (ref 8.6–10.3)
CEA SERPL-MCNC: 1.4 UG/L
CHLORIDE SERPL-SCNC: 102 MMOL/L (ref 98–107)
CO2 SERPL-SCNC: 28 MMOL/L (ref 21–32)
CREAT SERPL-MCNC: 1.17 MG/DL (ref 0.5–1.05)
EGFRCR SERPLBLD CKD-EPI 2021: 49 ML/MIN/1.73M*2
EOSINOPHIL # BLD AUTO: 0.15 X10*3/UL (ref 0–0.4)
EOSINOPHIL NFR BLD AUTO: 2.1 %
ERYTHROCYTE [DISTWIDTH] IN BLOOD BY AUTOMATED COUNT: 13.8 % (ref 11.5–14.5)
FERRITIN SERPL-MCNC: 195 NG/ML (ref 8–150)
GLUCOSE SERPL-MCNC: 98 MG/DL (ref 74–99)
HCT VFR BLD AUTO: 39.1 % (ref 36–46)
HGB BLD-MCNC: 12.3 G/DL (ref 12–16)
IMM GRANULOCYTES # BLD AUTO: 0.01 X10*3/UL (ref 0–0.5)
IMM GRANULOCYTES NFR BLD AUTO: 0.1 % (ref 0–0.9)
IRON SATN MFR SERPL: 23 % (ref 25–45)
IRON SERPL-MCNC: 72 UG/DL (ref 35–150)
LYMPHOCYTES # BLD AUTO: 1.98 X10*3/UL (ref 0.8–3)
LYMPHOCYTES NFR BLD AUTO: 28 %
MCH RBC QN AUTO: 28 PG (ref 26–34)
MCHC RBC AUTO-ENTMCNC: 31.5 G/DL (ref 32–36)
MCV RBC AUTO: 89 FL (ref 80–100)
MONOCYTES # BLD AUTO: 0.45 X10*3/UL (ref 0.05–0.8)
MONOCYTES NFR BLD AUTO: 6.4 %
NEUTROPHILS # BLD AUTO: 4.43 X10*3/UL (ref 1.6–5.5)
NEUTROPHILS NFR BLD AUTO: 62.8 %
PLATELET # BLD AUTO: 240 X10*3/UL (ref 150–450)
POTASSIUM SERPL-SCNC: 5 MMOL/L (ref 3.5–5.3)
PROT SERPL-MCNC: 7.1 G/DL (ref 6.4–8.2)
RBC # BLD AUTO: 4.4 X10*6/UL (ref 4–5.2)
SODIUM SERPL-SCNC: 138 MMOL/L (ref 136–145)
TIBC SERPL-MCNC: 309 UG/DL (ref 240–445)
UIBC SERPL-MCNC: 237 UG/DL (ref 110–370)
VIT B12 SERPL-MCNC: 595 PG/ML (ref 211–911)
WBC # BLD AUTO: 7.1 X10*3/UL (ref 4.4–11.3)

## 2024-12-19 PROCEDURE — 82607 VITAMIN B-12: CPT | Mod: GEALAB | Performed by: PHYSICIAN ASSISTANT

## 2024-12-19 PROCEDURE — 1126F AMNT PAIN NOTED NONE PRSNT: CPT | Performed by: PHYSICIAN ASSISTANT

## 2024-12-19 PROCEDURE — 85025 COMPLETE CBC W/AUTO DIFF WBC: CPT | Performed by: PHYSICIAN ASSISTANT

## 2024-12-19 PROCEDURE — 99214 OFFICE O/P EST MOD 30 MIN: CPT | Performed by: PHYSICIAN ASSISTANT

## 2024-12-19 PROCEDURE — 82378 CARCINOEMBRYONIC ANTIGEN: CPT | Mod: GEALAB | Performed by: PHYSICIAN ASSISTANT

## 2024-12-19 PROCEDURE — 82728 ASSAY OF FERRITIN: CPT | Performed by: PHYSICIAN ASSISTANT

## 2024-12-19 PROCEDURE — 83540 ASSAY OF IRON: CPT | Performed by: PHYSICIAN ASSISTANT

## 2024-12-19 PROCEDURE — 36415 COLL VENOUS BLD VENIPUNCTURE: CPT | Performed by: PHYSICIAN ASSISTANT

## 2024-12-19 PROCEDURE — 80053 COMPREHEN METABOLIC PANEL: CPT | Performed by: PHYSICIAN ASSISTANT

## 2024-12-19 ASSESSMENT — PAIN SCALES - GENERAL: PAINLEVEL_OUTOF10: 0-NO PAIN

## 2024-12-19 NOTE — PROGRESS NOTES
Patient Visit Information:   Visit Type: Follow Up Visit     Cancer History:   Treatment Synopsis:    Patient is a pleasant 71-year-old  female who was referred by Dr. Hendricks for evaluation and management of recently diagnosed colon cancer.    Patient presented to urgent care on 11/8/2021 for fatigue/weakness, low-grade fever. Noted to have significant LLQ tenderness and was sent to ED for further evaluation. CT abd/pelv obtained at that time revealed incompletely formed abscess/bowel perforation in the LLQ, likely secondary to diverticulitis. She was admitted for abd abscess, syncope, acute diverticulitis, and UTI. She had drain placed in diverticular abscess on 11/9/2021 w/ continuation of IV abx. Completed 10 day course of moxifloxacin for UTI. Of note ECHO was unremarkable.    She returned to the ED on 11/20/2021 for worsening abd pain. CT abd/pelv at that time showed new pigtail catheter in the region of a now near completely resolved abscess/bowel perforation. Focal colonic luminal narrowing, possibly stricture secondary to scarring, at the recent inflammatory site, with proximal colon dilation and moderate-sized stool burden, raising the possibility of colon obstruction. Therefore she was admitted and repeat CT abd/pelv on 11/22/2021 revealed pigtail catheter within the left lower quadrant without residual collection seen. Associated stable appearing wall thickening and reticulation adjacent to the colon within this region appears stable since comparison CT 11/20/2021. New region of mild wall thickening of the proximal transverse colon which may indicate developing colitis. Remainder findings appear stable since comparison CT abdomen pelvis 11/20/2021.    She underwent diagnostic laparoscopy, open left colon resection, colostomy, splenic flexure mobilization, and extensive lysis of adhesions on 11/23/2021. Pathology revealed invasive moderately differentiated adenocarcinoma w/ perforation and abscess  formation. Metastatic carcinoma involving 1/25 LNs. Margins negative for dysplasia and tumor. Omentum w/ fat necrosis, acute and chronic inflammation; negative for tumor. Normal mismatch repair protein expression.    Patient recovered from surgery, reports increased flatulence. She reports ~30 lbs weight loss over the last 6 months.    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 Benign haptic and renal cysts and right renal angiomylipoma. No metastatic disease to the chest. Progressive chronic interstitial changes, could be related to post chemotherapy.     Patient presents today for follow up. completed FOLFOX 7/27/2022. Oxaliplatin was held since cycle 8 of FOLFOX, pt tolerated much better; cycle 9 of FOLFOX was held during last treatment due to neutropenia.     c/o numbness of fingers and toes.  she reports that she feeling good. good energy and appetite. fever, chills, night sweats, abdominal pain, CP, SOB, and bleeding.    In 2/2023 had reversal of colostomy.     S/P  open right colon resection and LENNY for ascending colon large polyp on 10/20/2023. Path noted to be TVA 2.3 cm, NEGATIVE FOR HIGH-GRADE DYSPLASIA AND INVASIVE ADENOCARCINOMA.  SURGICAL RESECTION MARGINS ARE NEGATIVE FOR DYSPLASIA. TWENTY (20) BENIGN LYMPH NODES.    Past medical history: colon cancer as above, diverticulitis  Past surgical history: colectomy subtotal, colostomy, cholecystectomy, appendectomy  Family history: Denies family history of cancer or blood disorders.     History of Present Illness:   Patient presents for follow up accompanied by her . States that she feel well. On Gabapentin and helping with neuropathy in feet. Had c-scope 9/2024: internal (grade 1) and the site of the anastomosis appeared normal. Continues on oral iron. Otherwise doing well.     Review of Systems:    All of the systems have  "been reviewed and are negative for complaints except what is stated in the HPI and/or past medical history     Allergies and Intolerances:  Allergies   Allergen Reactions    Penicillins Hives     Outpatient Medication Profile:  Current Outpatient Medications on File Prior to Visit   Medication Sig Dispense Refill    acetaminophen (Tylenol) 325 mg tablet Take 1 tablet (325 mg) by mouth every 6 hours if needed for mild pain (1 - 3).      cyanocobalamin (Vitamin B-12) 250 mcg tablet Take 1 tablet (250 mcg) by mouth once daily.      ECHINACEA ORAL Take 1 tablet by mouth every other day.      ferrous sulfate 325 (65 Fe) MG EC tablet Take 65 mg by mouth once daily with a meal. Do not crush, chew, or split.      gabapentin (Neurontin) 300 mg capsule Take 1 capsule (300 mg) by mouth once daily at bedtime. 30 capsule 6     No current facility-administered medications on file prior to visit.     Vitals:      11/14/2023    10:05 AM 11/30/2023     9:20 AM 6/6/2024     9:32 AM 9/16/2024    10:48 AM 9/16/2024    11:51 AM 9/16/2024    12:06 PM 12/19/2024     9:15 AM   Vitals   Systolic 120 128 123 147 105 120 119   Diastolic 68 75 72 64 61 79 71   BP Location  Left arm Left arm  Left arm Left arm Left arm   Heart Rate 87 70 66 77 75 74 75   Temp  36.7 °C (98.1 °F) 36.2 °C (97.2 °F) 36.4 °C (97.5 °F) 36 °C (96.8 °F)  36 °C (96.8 °F)   Resp  18 16 18 16 16 16   Height 1.778 m (5' 10\") 1.734 m (5' 8.27\")   1.753 m (5' 9\")   1.75 m (5' 8.9\")    Weight (lb) 173.9 173.94 191.91 187.39   194.01   BMI 24.95 kg/m2 26.24 kg/m2 28.95 kg/m2 27.67 kg/m2   28.73 kg/m2   BSA (m2) 1.97 m2 1.95 m2 2.05 m2 2.03 m2   2.07 m2   Visit Report Report Report Report    Report       Significant value     Physical Exam:   Constitutional: alert, awake and oriented, not in acute distress   HEENT: moist mucous membranes, normal nose   Neck: supple, no lymphadenopathy   EYES: PERRL, EOM intact, conjunctiva normal  Skin: no jaundice, rash or " erythema  Neurological: AAOx3, no gross focal deficit   Psychiatric: normal mood and behavior     Radiographic images:  CT chest abdomen pelvis w IV contrast    Result Date: 12/14/2024  Interpreted By:  Kulwinder Parekh and Maltbie Grace STUDY: CT CHEST ABDOMEN PELVIS W IV CONTRAST;  12/12/2024 10:36 am   INDICATION: Signs/Symptoms:colorectal restaging.   ,C18.9 Malignant neoplasm of colon, unspecified   CEA = 1.2 on 06/06/2024   COMPARISON: CT chest abdomen pelvis 05/30/2024   ACCESSION NUMBER(S): [id number]   ORDERING CLINICIAN: MEGAN PARKER   TECHNIQUE: CT of the chest, abdomen, and pelvis was performed.  Contiguous axial images were obtained at 3 mm slice thickness through the chest, abdomen and pelvis. Coronal and sagittal reconstructions at 3 mm slice thickness were performed. 75 ML of Omnipaque 350 was administered intravenously without immediate complication. Positive oral contrast was given.   FINDINGS: CHEST:   LUNG/PLEURA/LARGE AIRWAYS: Similar bibasilar predominant subpleural reticulations and trace left pleural effusion. No new airspace consolidation, right pleural effusion or pneumothorax. Trachea and right and left main bronchi are patent.   VESSELS: Aorta and pulmonary arteries are normal caliber. Mild atherosclerotic changes of the aorta are identified. Mild coronary artery calcifications are present.   HEART: The heart is normal in size. Stable small pericardial effusion.   MEDIASTINUM AND LANDON: Stable prominent mediastinal lymph nodes, not enlarged by size criteria, for example a 21 x 9 mm subcarinal lymph node on series 201, image 44. No hilar or axillary lymphadenopathy. The esophagus is normal.   CHEST WALL AND LOWER NECK: No soft tissue masses in the chest wall. The visualized thyroid gland appears within normal limits.   ABDOMEN:   LIVER: The liver is normal in size. Stable hypoattenuating lesions in the liver, measuring up to 5.0 cm in the right hepatic lobe, likely benign. No new liver  lesion.   BILE DUCTS: No biliary dilatation.   GALLBLADDER: Cholecystectomy   PANCREAS: The pancreas appears unremarkable without evidence of ductal dilatation or masses.   SPLEEN: The spleen is normal in size.   ADRENAL GLANDS: No adrenal nodule or thickening.   KIDNEYS AND URETERS: The kidneys are normal in size and enhance symmetrically. Stable hypoattenuating lesions in the bilateral kidneys, likely benign. No hydroureteronephrosis or nephroureterolithiasis is identified.   PELVIS:   BLADDER: Within normal limits.   REPRODUCTIVE ORGANS: Uterus is present.   BOWEL: The stomach is unremarkable. Positive oral contrast is noted in the stomach, small and large bowel. The small and large bowel are normal in caliber and demonstrate no wall thickening. The appendix is not definitely visualized. There is however no pericecal stranding or fluid. Suture material is noted in the sigmoid colon. Fluid-filled rectum and colon with mild hyperattenuating material is likely secondary to positive oral contrast mixing with stool content, rather than colitis.   VESSELS: Moderate atherosclerotic calcifications of the aortoiliac arteries. The IVC appears normal. Portal vein, splenic vein, and SMV are patent.   PERITONEUM/RETROPERITONEUM/LYMPH NODES: No ascites or fluid collection. No peritoneal nodularity or deposits. No abdominopelvic lymphadenopathy is present.   BONE AND SOFT TISSUE: No suspicious osseous lesions are identified. Diffuse osteopenia. Postsurgical changes of left femoral neck screw fixation. No soft tissue masses in the abdominal wall.       Colon cancer restaging scan, in comparison to prior CT from May 2024: 1. No definite evidence of recurrence in the chest abdomen and pelvis. 2. Fluid-filled rectum and colon with mild hyperattenuating material is likely secondary to positive oral contrast mixing with stool content, rather than colitis. 3. Stable chronic and incidental findings as detailed above.   I personally  reviewed the images/study and I agree with the findings as stated by Jenni Lisa MD. This study was interpreted at [facility], [place], [place].   MACRO: None   Signed by: Kulwinder Parekh 12/14/2024 10:53 AM Dictation workstation:   [id number]       Assessment:    Patient is a pleasant 71-year-old  female who presents with stage IIIB pT4a pN1a invasive moderately differentiated adenocarcinoma of descending and sigmoid colon. s/p open left colon resection, colostomy 11/2021. Metastatic carcinoma involving 1/25 LNs. Margins negative for dysplasia and tumor. Mismatch repair protein proficient.    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 Path noted to be TVA 2.3 cm, NEGATIVE FOR HIGH-GRADE DYSPLASIA AND INVASIVE ADENOCARCINOMA.  SURGICAL RESECTION MARGINS ARE NEGATIVE FOR DYSPLASIA. TWENTY (20) BENIGN LYMPH NODES.    Plan:    Reviewed and discussed lab results with patient in detail as well as treatment options.     Continue surveillance w/CT c/a/p every 6 months     Continue to monitor CBCd, CMP, CEA     Continue low dose Gabapentin for b/l feel neuropathy     C-scope in 3 years- 10/2027    Can stop oral iron    F/U w/PCP    RTC in 6 months    Patient verbalized understanding, and all her questions were answered to her satisfaction     30 min spent with patient greater than 50 % of which was spent in consultation, counselling and coordination of care

## 2025-02-25 ENCOUNTER — APPOINTMENT (OUTPATIENT)
Dept: RADIOLOGY | Facility: HOSPITAL | Age: 75
End: 2025-02-25
Payer: MEDICARE

## 2025-02-25 ENCOUNTER — HOSPITAL ENCOUNTER (EMERGENCY)
Facility: HOSPITAL | Age: 75
Discharge: HOME | End: 2025-02-25
Payer: MEDICARE

## 2025-02-25 VITALS
RESPIRATION RATE: 18 BRPM | WEIGHT: 165 LBS | HEIGHT: 70 IN | OXYGEN SATURATION: 99 % | DIASTOLIC BLOOD PRESSURE: 73 MMHG | SYSTOLIC BLOOD PRESSURE: 158 MMHG | HEART RATE: 76 BPM | TEMPERATURE: 98.4 F | BODY MASS INDEX: 23.62 KG/M2

## 2025-02-25 DIAGNOSIS — S62.102A CLOSED FRACTURE OF LEFT WRIST, INITIAL ENCOUNTER: Primary | ICD-10-CM

## 2025-02-25 PROCEDURE — 29125 APPL SHORT ARM SPLINT STATIC: CPT | Mod: LT

## 2025-02-25 PROCEDURE — 96374 THER/PROPH/DIAG INJ IV PUSH: CPT | Mod: 59

## 2025-02-25 PROCEDURE — 96375 TX/PRO/DX INJ NEW DRUG ADDON: CPT | Mod: 59

## 2025-02-25 PROCEDURE — 73502 X-RAY EXAM HIP UNI 2-3 VIEWS: CPT | Mod: LT

## 2025-02-25 PROCEDURE — 73110 X-RAY EXAM OF WRIST: CPT | Mod: 77,LT

## 2025-02-25 PROCEDURE — 2500000004 HC RX 250 GENERAL PHARMACY W/ HCPCS (ALT 636 FOR OP/ED): Performed by: PHYSICIAN ASSISTANT

## 2025-02-25 PROCEDURE — 99284 EMERGENCY DEPT VISIT MOD MDM: CPT | Mod: 25

## 2025-02-25 PROCEDURE — 73110 X-RAY EXAM OF WRIST: CPT | Mod: LEFT SIDE | Performed by: RADIOLOGY

## 2025-02-25 PROCEDURE — 73110 X-RAY EXAM OF WRIST: CPT | Mod: LT

## 2025-02-25 RX ORDER — ONDANSETRON HYDROCHLORIDE 2 MG/ML
4 INJECTION, SOLUTION INTRAVENOUS ONCE
Status: COMPLETED | OUTPATIENT
Start: 2025-02-25 | End: 2025-02-25

## 2025-02-25 RX ORDER — MORPHINE SULFATE 4 MG/ML
4 INJECTION INTRAVENOUS ONCE
Status: COMPLETED | OUTPATIENT
Start: 2025-02-25 | End: 2025-02-25

## 2025-02-25 RX ORDER — DOCUSATE SODIUM 100 MG/1
100 CAPSULE, LIQUID FILLED ORAL EVERY 12 HOURS
Qty: 14 CAPSULE | Refills: 0 | Status: SHIPPED | OUTPATIENT
Start: 2025-02-25 | End: 2025-03-04

## 2025-02-25 RX ORDER — OXYCODONE HYDROCHLORIDE 5 MG/1
5 TABLET ORAL EVERY 6 HOURS PRN
Qty: 12 TABLET | Refills: 0 | Status: SHIPPED | OUTPATIENT
Start: 2025-02-25 | End: 2025-02-28 | Stop reason: HOSPADM

## 2025-02-25 RX ADMIN — HYDROMORPHONE HYDROCHLORIDE 0.5 MG: 1 INJECTION, SOLUTION INTRAMUSCULAR; INTRAVENOUS; SUBCUTANEOUS at 17:20

## 2025-02-25 RX ADMIN — MORPHINE SULFATE 4 MG: 4 INJECTION INTRAVENOUS at 16:48

## 2025-02-25 RX ADMIN — ONDANSETRON 4 MG: 2 INJECTION, SOLUTION INTRAMUSCULAR; INTRAVENOUS at 16:48

## 2025-02-25 ASSESSMENT — PAIN DESCRIPTION - LOCATION: LOCATION: WRIST

## 2025-02-25 ASSESSMENT — LIFESTYLE VARIABLES
HAVE PEOPLE ANNOYED YOU BY CRITICIZING YOUR DRINKING: NO
EVER HAD A DRINK FIRST THING IN THE MORNING TO STEADY YOUR NERVES TO GET RID OF A HANGOVER: NO
HAVE YOU EVER FELT YOU SHOULD CUT DOWN ON YOUR DRINKING: NO
EVER FELT BAD OR GUILTY ABOUT YOUR DRINKING: NO
TOTAL SCORE: 0

## 2025-02-25 ASSESSMENT — PAIN - FUNCTIONAL ASSESSMENT: PAIN_FUNCTIONAL_ASSESSMENT: 0-10

## 2025-02-25 ASSESSMENT — PAIN DESCRIPTION - ORIENTATION: ORIENTATION: LEFT

## 2025-02-25 ASSESSMENT — PAIN SCALES - GENERAL: PAINLEVEL_OUTOF10: 8

## 2025-02-25 NOTE — ED PROVIDER NOTES
HPI   Chief Complaint   Patient presents with    Wrist Pain     Fell down 2 steps landing on left wrist and left hip. No head injury, no thinners        Is a 74-year-old female coming for left wrist pain.  She is right-hand dominant.  She states that she was coming down a stepladder when she missed the bottom step and caused her to fall directly onto the left wrist.  No other areas of pain or injury.  She is not on anticoagulation medication.  Patient states that she fell onto the hip but does not have pain to this area.  She did not hit her head.  No loss consciousness.      History provided by:  Patient          Patient History   Past Medical History:   Diagnosis Date    Closed fracture of left hip     Diverticulitis     Peripheral neuropathy     Tubulovillous adenoma of colon      Past Surgical History:   Procedure Laterality Date    APPENDECTOMY      CHOLECYSTECTOMY      COLECTOMY  10/20/2023    large polyp removed    COLOSTOMY  12/02/2021    COLOSTOMY CLOSURE      CT GUIDED PERCUTANEOUS PERITONEAL OR RETROPERITONEAL FLUID COLLECTION DRAINAGE  11/09/2021    CT GUIDED PERCUTANEOUS PERITONEAL OR RETROPERITONEAL FLUID COLLECTION DRAINAGE 11/9/2021 [facility] CLINICAL LEGACY    ECTOPIC PREGNANCY SURGERY      MEDIPORT      since removed    ORIF HIP FRACTURE      left 8/22    ORIF RADIAL SHAFT FRACTURE       Family History   Problem Relation Name Age of Onset    Heart attack Father  60 - 69     Social History     Tobacco Use    Smoking status: Never    Smokeless tobacco: Never   Vaping Use    Vaping status: Never Used   Substance Use Topics    Alcohol use: Yes     Comment: 1 time a month    Drug use: Never       Physical Exam   ED Triage Vitals [02/25/25 1441]   Temperature Heart Rate Respirations BP   36.4 °C (97.5 °F) 86 18 162/73      Pulse Ox Temp Source Heart Rate Source Patient Position   100 % Skin Monitor Sitting      BP Location FiO2 (%)     Right arm --       Physical Exam  Vitals and nursing note reviewed.    Constitutional:       General: She is not in acute distress.     Appearance: Normal appearance. She is not toxic-appearing.   HENT:      Head: Normocephalic and atraumatic.      Nose: Nose normal.      Mouth/Throat:      Mouth: Mucous membranes are moist.      Pharynx: Oropharynx is clear.   Eyes:      Extraocular Movements: Extraocular movements intact.      Conjunctiva/sclera: Conjunctivae normal.      Pupils: Pupils are equal, round, and reactive to light.   Cardiovascular:      Rate and Rhythm: Normal rate and regular rhythm.      Pulses: Normal pulses.      Heart sounds: Normal heart sounds.   Pulmonary:      Effort: Pulmonary effort is normal. No respiratory distress.      Breath sounds: Normal breath sounds.   Abdominal:      General: Abdomen is flat. Bowel sounds are normal.      Palpations: Abdomen is soft.      Tenderness: There is no abdominal tenderness.   Musculoskeletal:         General: Normal range of motion.      Cervical back: Normal range of motion and neck supple.      Comments: Patient does have swelling however obvious deformity to the distal left wrist.  There is angulation.  Patient has strong pulses to both the radial and lower side of the wrist.  Capillary refill is less than 2 seconds.   Skin:     General: Skin is warm and dry.      Capillary Refill: Capillary refill takes less than 2 seconds.      Coloration: Skin is not jaundiced or pale.      Findings: No bruising.   Neurological:      General: No focal deficit present.      Mental Status: She is alert and oriented to person, place, and time. Mental status is at baseline.   Psychiatric:         Mood and Affect: Mood normal.         Behavior: Behavior normal.           ED Course & MDM   Diagnoses as of 02/25/25 1839   Closed fracture of left wrist, initial encounter                 No data recorded     Erica Coma Scale Score: 15 (02/25/25 1604 : Will Sena RN)                           Medical Decision Making  Summary:  Medical  Decision Making:   Patient presented as described in HPI. Patient case including ROS, PE, and treatment and plan discussed with ED attending if attached as cosigner. Results from labs and or imaging included below if completed. Aurelia Thayer  is a 74 y.o. coming in for Patient presents with:  Wrist Pain: Fell down 2 steps landing on left wrist and left hip. No head injury, no thinners   .  Imaging was completed of the left wrist.  Patient also had imaging of the left hip due to the fall to this area however no pain.  Imaging was completed and patient does have obvious fracture to the left wrist.  Attempted reduction was completed using pain medication as well as finger traps and gentle traction.  Repeat imaging was completed after patient was placed into a sugar-tong splint by nursing staff.  Patient continues to have angulation and deformity.  Again she is neurovascularly intact and reassessed.  Images were sent to Dr. Callahan who states that the patient calls at 8:00 in the morning can follow-up with him tomorrow.  Patient is made aware of this plan.  Pain medication were sent to her pharmacy.  She did require further IV pain medication here for discomfort.  She will be discharged afterwards again in a sugar-tong splint as well as an arm sling.  She will call Dr. Callahan tomorrow morning.      Disposition is completed with shared decision making with the patient or guardian present with the patient. They were advised to follow up with PCP or recommended provider in 2-3 days for another evaluation and exam. I advised the patient to return or go to closest emergency room immediately if symptoms change, get worse, or new symptoms develop prior to follow up. I explained the plan and treatment course. Patient/guardian is in agreement with plan, treatment course, and follow up and state that they will comply.    Labs Reviewed - No data to display   XR wrist left 3+ views   Final Result    1. Similar alignment of the  comminuted, displaced, intra-articular    distal radial fracture.                      Signed by: Salinas Paige 2/25/2025 6:15 PM    Dictation workstation:   [id number]     XR hip left with pelvis when performed 2 or 3 views   Final Result    Stable postsurgical fixation of left femoral neck with unchanged    appearance of the hardware. No evidence of acute fracture or    dislocation.          Signed by: Caio Beckman 2/25/2025 4:43 PM    Dictation workstation:   [id number]     XR wrist left 3+ views   Final Result    Comminuted displaced and angulated intra-articular fracture of the    distal radial metaphysis.          Signed by: Chang Meier 2/25/2025 3:13 PM    Dictation workstation:   [id number]                            Tests/Medications/Escalations of Care considered but not given: As in MDM    Patient care discussed with: N/A  Social Determinants affecting care: N/A    Final diagnosis and disposition as documented     Diagnoses as of 02/25/25 1850  Closed fracture of left wrist, initial encounter       Shared decision making was completed and determined that patient will be discharged. I discussed the differential; results and discharge plan with the patient and/or family/friend/caregiver if present.  I emphasized the importance of follow-up with the physician I referred them to in the timeframe recommended.  I explained reasons for the patient to return to the Emergency Department. They agreed that if they feel their condition is worsening or if they have any other concern they should call 911 immediately for further assistance. I gave the patient an opportunity to ask all questions they had and answered all of them accordingly. They understand return precautions and discharge instructions. The patient and/or family/friend/caregiver expressed understanding verbally and that they would comply.     Disposition: Discharge      This note has been transcribed using voice recognition and may contain  grammatical errors, misplaced words, incorrect words, incorrect phrases or other errors.         Procedure  Procedures     Sriram Escobar PA-C  02/25/25 5231

## 2025-02-25 NOTE — ED TRIAGE NOTES
Patient here for wrist pain Fell down 2 steps landing on left wrist and left hip. No head injury, no thinners

## 2025-02-26 ENCOUNTER — ANESTHESIA EVENT (OUTPATIENT)
Dept: OPERATING ROOM | Facility: HOSPITAL | Age: 75
End: 2025-02-26
Payer: MEDICARE

## 2025-02-26 ENCOUNTER — PREP FOR PROCEDURE (OUTPATIENT)
Dept: ORTHOPEDICS | Facility: HOSPITAL | Age: 75
End: 2025-02-26
Payer: MEDICARE

## 2025-02-26 DIAGNOSIS — S52.532A CLOSED COLLES' FRACTURE OF LEFT RADIUS, INITIAL ENCOUNTER: Primary | ICD-10-CM

## 2025-02-26 NOTE — H&P (VIEW-ONLY)
Patient ID: Aurelia Thayer is a 74 y.o. female who presents for Pre-op Exam.  History of Present Illness  The patient presents for preoperative clearance.    She is scheduled for a surgical procedure on her left wrist, which involves the insertion of screws and plates, to be performed by Dr. Montilla on Friday. She has a history of multiple surgeries without any complications related to anesthesia. The patient has not yet undergone the required blood work for the upcoming surgery. A prescription for oxycodone was provided, but she has not yet collected it from the pharmacy. She does not take any regular medications except for vitamin B12. She reports no current health issues and does not have a primary care physician. She is seeking to establish care with a primary care provider. The need for the upcoming surgery arose after she experienced a fall while cleaning her home, during which she lost her balance and missed the bottom step of a two-step ladder.    Supplemental Information  She has a history of colon cancer and is under the care of Dr. Mcgregor, an oncologist.    MEDICATIONS  Vitamin B12          Review of Systems   Constitutional: Negative.    HENT: Negative.     Respiratory: Negative.     Cardiovascular: Negative.    Genitourinary: Negative.    Musculoskeletal:  Positive for arthralgias and joint swelling.   Neurological: Negative.    Psychiatric/Behavioral: Negative.       Medical, Surgical, Family, and Social History reviewed.    Current Outpatient Medications   Medication Sig Dispense Refill   • Docusate Sodium (DSS) 100 MG capsule Take 100 mg by mouth every 12 (twelve) hours     • oxyCODONE (Roxicodone) 5 MG immediate release tablet Take 5 mg by mouth every 6 (six) hours if needed     • acetaminophen (Tylenol) 325 MG tablet Take 325 mg by mouth every 6 (six) hours if needed     • cyanocobalamin (Vitamin B-12) 250 MCG tablet Take 250 mcg by mouth in the morning.       No current facility-administered  medications for this visit.     Physical Exam  Constitutional:       Appearance: Normal appearance.   HENT:      Head: Normocephalic.      Mouth/Throat:      Mouth: Mucous membranes are moist.      Pharynx: Oropharynx is clear.   Eyes:      Extraocular Movements: Extraocular movements intact.   Cardiovascular:      Rate and Rhythm: Normal rate and regular rhythm.      Heart sounds: Normal heart sounds.   Pulmonary:      Breath sounds: Normal breath sounds.   Musculoskeletal:      Cervical back: Normal range of motion and neck supple.   Neurological:      General: No focal deficit present.      Mental Status: She is alert and oriented to person, place, and time.        Physical Exam  Oral exam was performed.  Lungs were auscultated.    Vital Signs  Blood pressure is normal.       Assessment & Plan  1. Preoperative clearance.  Her EKG results are within normal limits, and her blood pressure readings are also satisfactory. A comprehensive blood work panel will be ordered to ensure there are no underlying abnormalities that could potentially complicate the upcoming surgery. The results of these tests will be forwarded to Dr. Montilla for further evaluation. She has been advised to schedule an annual physical examination, which should include routine blood work and blood pressure monitoring.    Results  Testing  EKG results are normal.       Problem List Items Addressed This Visit    None  Visit Diagnoses       Preop examination    -  Primary    Relevant Orders    CBC and differential    Basic metabolic panel             Orders Placed This Encounter   Procedures   • CBC and differential     Standing Status:   Future     Number of Occurrences:   1     Standing Expiration Date:   2/26/2026     Order Specific Question:   Print requisition?     Answer:   No   • Basic metabolic panel     Standing Status:   Future     Number of Occurrences:   1     Standing Expiration Date:   2/26/2026     Order Specific Question:   Print  requisition?     Answer:   No

## 2025-02-28 ENCOUNTER — ANESTHESIA (OUTPATIENT)
Dept: OPERATING ROOM | Facility: HOSPITAL | Age: 75
End: 2025-02-28
Payer: MEDICARE

## 2025-02-28 ENCOUNTER — PHARMACY VISIT (OUTPATIENT)
Dept: PHARMACY | Facility: CLINIC | Age: 75
End: 2025-02-28
Payer: MEDICARE

## 2025-02-28 ENCOUNTER — APPOINTMENT (OUTPATIENT)
Dept: RADIOLOGY | Facility: HOSPITAL | Age: 75
End: 2025-02-28
Payer: MEDICARE

## 2025-02-28 ENCOUNTER — HOSPITAL ENCOUNTER (OUTPATIENT)
Facility: HOSPITAL | Age: 75
Setting detail: OUTPATIENT SURGERY
Discharge: HOME | End: 2025-02-28
Attending: ORTHOPAEDIC SURGERY | Admitting: ORTHOPAEDIC SURGERY
Payer: MEDICARE

## 2025-02-28 VITALS
HEART RATE: 80 BPM | RESPIRATION RATE: 16 BRPM | HEIGHT: 70 IN | OXYGEN SATURATION: 97 % | BODY MASS INDEX: 28.72 KG/M2 | SYSTOLIC BLOOD PRESSURE: 134 MMHG | TEMPERATURE: 97.7 F | WEIGHT: 200.62 LBS | DIASTOLIC BLOOD PRESSURE: 57 MMHG

## 2025-02-28 DIAGNOSIS — S52.532A COLLES' FRACTURE OF LEFT RADIUS, INITIAL ENCOUNTER FOR CLOSED FRACTURE: Primary | ICD-10-CM

## 2025-02-28 PROCEDURE — 2780000003 HC OR 278 NO HCPCS: Performed by: ORTHOPAEDIC SURGERY

## 2025-02-28 PROCEDURE — 3700000002 HC GENERAL ANESTHESIA TIME - EACH INCREMENTAL 1 MINUTE: Performed by: ORTHOPAEDIC SURGERY

## 2025-02-28 PROCEDURE — 7100000001 HC RECOVERY ROOM TIME - INITIAL BASE CHARGE: Performed by: ORTHOPAEDIC SURGERY

## 2025-02-28 PROCEDURE — 3600000004 HC OR TIME - INITIAL BASE CHARGE - PROCEDURE LEVEL FOUR: Performed by: ORTHOPAEDIC SURGERY

## 2025-02-28 PROCEDURE — 2720000007 HC OR 272 NO HCPCS: Performed by: ORTHOPAEDIC SURGERY

## 2025-02-28 PROCEDURE — 7100000009 HC PHASE TWO TIME - INITIAL BASE CHARGE: Performed by: ORTHOPAEDIC SURGERY

## 2025-02-28 PROCEDURE — 3700000001 HC GENERAL ANESTHESIA TIME - INITIAL BASE CHARGE: Performed by: ORTHOPAEDIC SURGERY

## 2025-02-28 PROCEDURE — 2500000004 HC RX 250 GENERAL PHARMACY W/ HCPCS (ALT 636 FOR OP/ED): Performed by: NURSE ANESTHETIST, CERTIFIED REGISTERED

## 2025-02-28 PROCEDURE — 3600000009 HC OR TIME - EACH INCREMENTAL 1 MINUTE - PROCEDURE LEVEL FOUR: Performed by: ORTHOPAEDIC SURGERY

## 2025-02-28 PROCEDURE — 2500000004 HC RX 250 GENERAL PHARMACY W/ HCPCS (ALT 636 FOR OP/ED): Performed by: ANESTHESIOLOGY

## 2025-02-28 PROCEDURE — C1713 ANCHOR/SCREW BN/BN,TIS/BN: HCPCS | Performed by: ORTHOPAEDIC SURGERY

## 2025-02-28 PROCEDURE — RXMED WILLOW AMBULATORY MEDICATION CHARGE

## 2025-02-28 PROCEDURE — 7100000002 HC RECOVERY ROOM TIME - EACH INCREMENTAL 1 MINUTE: Performed by: ORTHOPAEDIC SURGERY

## 2025-02-28 PROCEDURE — 7100000010 HC PHASE TWO TIME - EACH INCREMENTAL 1 MINUTE: Performed by: ORTHOPAEDIC SURGERY

## 2025-02-28 PROCEDURE — 76000 FLUOROSCOPY <1 HR PHYS/QHP: CPT

## 2025-02-28 DEVICE — IMPLANTABLE DEVICE: Type: IMPLANTABLE DEVICE | Site: WRIST | Status: FUNCTIONAL

## 2025-02-28 DEVICE — SCREW, HEXALOBE, LOCKING, 3.5MM X 12MM: Type: IMPLANTABLE DEVICE | Site: WRIST | Status: FUNCTIONAL

## 2025-02-28 DEVICE — BONE, CHIP, CANCELLOUS, 1.7-10 MM, 5 CC: Type: IMPLANTABLE DEVICE | Site: WRIST | Status: FUNCTIONAL

## 2025-02-28 DEVICE — GUIDEWIRE, .054 X 6 IN: Type: IMPLANTABLE DEVICE | Site: WRIST | Status: NON-FUNCTIONAL

## 2025-02-28 DEVICE — SCREW, HEXALOB, NON-LOCKING, 3.5MM X 12MM: Type: IMPLANTABLE DEVICE | Site: WRIST | Status: FUNCTIONAL

## 2025-02-28 RX ORDER — MIDAZOLAM HYDROCHLORIDE 1 MG/ML
INJECTION, SOLUTION INTRAMUSCULAR; INTRAVENOUS AS NEEDED
Status: DISCONTINUED | OUTPATIENT
Start: 2025-02-28 | End: 2025-02-28

## 2025-02-28 RX ORDER — DROPERIDOL 2.5 MG/ML
0.62 INJECTION, SOLUTION INTRAMUSCULAR; INTRAVENOUS ONCE AS NEEDED
Status: DISCONTINUED | OUTPATIENT
Start: 2025-02-28 | End: 2025-02-28 | Stop reason: HOSPADM

## 2025-02-28 RX ORDER — SODIUM CHLORIDE, SODIUM LACTATE, POTASSIUM CHLORIDE, CALCIUM CHLORIDE 600; 310; 30; 20 MG/100ML; MG/100ML; MG/100ML; MG/100ML
INJECTION, SOLUTION INTRAVENOUS CONTINUOUS PRN
Status: DISCONTINUED | OUTPATIENT
Start: 2025-02-28 | End: 2025-02-28

## 2025-02-28 RX ORDER — DEXAMETHASONE SODIUM PHOSPHATE 10 MG/ML
INJECTION INTRAMUSCULAR; INTRAVENOUS AS NEEDED
Status: DISCONTINUED | OUTPATIENT
Start: 2025-02-28 | End: 2025-02-28

## 2025-02-28 RX ORDER — DIPHENHYDRAMINE HYDROCHLORIDE 50 MG/ML
12.5 INJECTION INTRAMUSCULAR; INTRAVENOUS ONCE AS NEEDED
Status: DISCONTINUED | OUTPATIENT
Start: 2025-02-28 | End: 2025-02-28 | Stop reason: HOSPADM

## 2025-02-28 RX ORDER — VANCOMYCIN HYDROCHLORIDE 1 G/20ML
1 INJECTION, POWDER, LYOPHILIZED, FOR SOLUTION INTRAVENOUS ONCE
Status: CANCELLED | OUTPATIENT
Start: 2025-02-28 | End: 2025-02-28

## 2025-02-28 RX ORDER — SODIUM CHLORIDE, SODIUM LACTATE, POTASSIUM CHLORIDE, CALCIUM CHLORIDE 600; 310; 30; 20 MG/100ML; MG/100ML; MG/100ML; MG/100ML
20 INJECTION, SOLUTION INTRAVENOUS CONTINUOUS
Status: CANCELLED | OUTPATIENT
Start: 2025-02-28 | End: 2025-03-01

## 2025-02-28 RX ORDER — MEPERIDINE HYDROCHLORIDE 25 MG/ML
12.5 INJECTION INTRAMUSCULAR; INTRAVENOUS; SUBCUTANEOUS EVERY 10 MIN PRN
Status: DISCONTINUED | OUTPATIENT
Start: 2025-02-28 | End: 2025-02-28 | Stop reason: HOSPADM

## 2025-02-28 RX ORDER — HYDROMORPHONE HYDROCHLORIDE 2 MG/ML
INJECTION, SOLUTION INTRAMUSCULAR; INTRAVENOUS; SUBCUTANEOUS AS NEEDED
Status: DISCONTINUED | OUTPATIENT
Start: 2025-02-28 | End: 2025-02-28

## 2025-02-28 RX ORDER — FENTANYL CITRATE 50 UG/ML
INJECTION, SOLUTION INTRAMUSCULAR; INTRAVENOUS AS NEEDED
Status: DISCONTINUED | OUTPATIENT
Start: 2025-02-28 | End: 2025-02-28

## 2025-02-28 RX ORDER — OXYCODONE AND ACETAMINOPHEN 5; 325 MG/1; MG/1
1 TABLET ORAL EVERY 6 HOURS PRN
Qty: 15 TABLET | Refills: 0 | Status: SHIPPED | OUTPATIENT
Start: 2025-02-28

## 2025-02-28 RX ORDER — ROCURONIUM BROMIDE 10 MG/ML
INJECTION, SOLUTION INTRAVENOUS AS NEEDED
Status: DISCONTINUED | OUTPATIENT
Start: 2025-02-28 | End: 2025-02-28

## 2025-02-28 RX ORDER — ONDANSETRON HYDROCHLORIDE 2 MG/ML
4 INJECTION, SOLUTION INTRAVENOUS ONCE AS NEEDED
Status: DISCONTINUED | OUTPATIENT
Start: 2025-02-28 | End: 2025-02-28 | Stop reason: HOSPADM

## 2025-02-28 RX ORDER — PHENYLEPHRINE HCL IN 0.9% NACL 0.4MG/10ML
SYRINGE (ML) INTRAVENOUS AS NEEDED
Status: DISCONTINUED | OUTPATIENT
Start: 2025-02-28 | End: 2025-02-28

## 2025-02-28 RX ORDER — SODIUM CHLORIDE, SODIUM LACTATE, POTASSIUM CHLORIDE, CALCIUM CHLORIDE 600; 310; 30; 20 MG/100ML; MG/100ML; MG/100ML; MG/100ML
100 INJECTION, SOLUTION INTRAVENOUS CONTINUOUS
Status: DISCONTINUED | OUTPATIENT
Start: 2025-02-28 | End: 2025-02-28 | Stop reason: HOSPADM

## 2025-02-28 RX ORDER — ALBUTEROL SULFATE 0.83 MG/ML
2.5 SOLUTION RESPIRATORY (INHALATION) ONCE AS NEEDED
Status: DISCONTINUED | OUTPATIENT
Start: 2025-02-28 | End: 2025-02-28 | Stop reason: HOSPADM

## 2025-02-28 RX ORDER — CEFAZOLIN 1 G/1
INJECTION, POWDER, FOR SOLUTION INTRAVENOUS AS NEEDED
Status: DISCONTINUED | OUTPATIENT
Start: 2025-02-28 | End: 2025-02-28

## 2025-02-28 RX ORDER — LIDOCAINE HYDROCHLORIDE 10 MG/ML
INJECTION, SOLUTION EPIDURAL; INFILTRATION; INTRACAUDAL; PERINEURAL AS NEEDED
Status: DISCONTINUED | OUTPATIENT
Start: 2025-02-28 | End: 2025-02-28

## 2025-02-28 RX ORDER — PROPOFOL 10 MG/ML
INJECTION, EMULSION INTRAVENOUS AS NEEDED
Status: DISCONTINUED | OUTPATIENT
Start: 2025-02-28 | End: 2025-02-28

## 2025-02-28 RX ORDER — ONDANSETRON HYDROCHLORIDE 2 MG/ML
INJECTION, SOLUTION INTRAVENOUS AS NEEDED
Status: DISCONTINUED | OUTPATIENT
Start: 2025-02-28 | End: 2025-02-28

## 2025-02-28 RX ORDER — OXYCODONE HYDROCHLORIDE 5 MG/1
5 TABLET ORAL EVERY 4 HOURS PRN
Status: DISCONTINUED | OUTPATIENT
Start: 2025-02-28 | End: 2025-02-28 | Stop reason: HOSPADM

## 2025-02-28 RX ADMIN — FENTANYL CITRATE 50 MCG: 50 INJECTION, SOLUTION INTRAMUSCULAR; INTRAVENOUS at 09:09

## 2025-02-28 RX ADMIN — PROPOFOL 30 MG: 10 INJECTION, EMULSION INTRAVENOUS at 10:07

## 2025-02-28 RX ADMIN — DEXAMETHASONE SODIUM PHOSPHATE 4 MG: 10 INJECTION, SOLUTION INTRAMUSCULAR; INTRAVENOUS at 09:01

## 2025-02-28 RX ADMIN — FENTANYL CITRATE 50 MCG: 50 INJECTION, SOLUTION INTRAMUSCULAR; INTRAVENOUS at 08:56

## 2025-02-28 RX ADMIN — Medication 80 MCG: at 09:23

## 2025-02-28 RX ADMIN — SODIUM CHLORIDE, POTASSIUM CHLORIDE, SODIUM LACTATE AND CALCIUM CHLORIDE: 600; 310; 30; 20 INJECTION, SOLUTION INTRAVENOUS at 08:47

## 2025-02-28 RX ADMIN — ONDANSETRON 4 MG: 2 INJECTION, SOLUTION INTRAMUSCULAR; INTRAVENOUS at 08:53

## 2025-02-28 RX ADMIN — PROPOFOL 20 MG: 10 INJECTION, EMULSION INTRAVENOUS at 10:11

## 2025-02-28 RX ADMIN — MIDAZOLAM 2 MG: 1 INJECTION INTRAMUSCULAR; INTRAVENOUS at 08:45

## 2025-02-28 RX ADMIN — HYDROMORPHONE HYDROCHLORIDE 0.4 MG: 2 INJECTION, SOLUTION INTRAMUSCULAR; INTRAVENOUS; SUBCUTANEOUS at 10:01

## 2025-02-28 RX ADMIN — PROPOFOL 120 MG: 10 INJECTION, EMULSION INTRAVENOUS at 08:56

## 2025-02-28 RX ADMIN — PROPOFOL 30 MG: 10 INJECTION, EMULSION INTRAVENOUS at 10:04

## 2025-02-28 RX ADMIN — CEFAZOLIN 2 G: 330 INJECTION, POWDER, FOR SOLUTION INTRAMUSCULAR; INTRAVENOUS at 09:01

## 2025-02-28 RX ADMIN — Medication 80 MCG: at 09:34

## 2025-02-28 RX ADMIN — LIDOCAINE HYDROCHLORIDE 50 MG: 10 SOLUTION INTRAVENOUS at 08:56

## 2025-02-28 RX ADMIN — ROCURONIUM BROMIDE 20 MG: 10 INJECTION, SOLUTION INTRAVENOUS at 08:56

## 2025-02-28 RX ADMIN — SODIUM CHLORIDE, POTASSIUM CHLORIDE, SODIUM LACTATE AND CALCIUM CHLORIDE: 600; 310; 30; 20 INJECTION, SOLUTION INTRAVENOUS at 09:42

## 2025-02-28 SDOH — HEALTH STABILITY: MENTAL HEALTH: CURRENT SMOKER: 0

## 2025-02-28 ASSESSMENT — PAIN SCALES - GENERAL
PAINLEVEL_OUTOF10: 0 - NO PAIN

## 2025-02-28 ASSESSMENT — PAIN - FUNCTIONAL ASSESSMENT
PAIN_FUNCTIONAL_ASSESSMENT: 0-10

## 2025-02-28 NOTE — ANESTHESIA PROCEDURE NOTES
Airway  Date/Time: 2/28/2025 8:59 AM  Urgency: elective    Airway not difficult    Staffing  Performed: CRNA   Authorized by: Marvin Epps MD    Performed by: ALEXANDRA Lan-TOM  Patient location during procedure: OR    Indications and Patient Condition  Indications for airway management: anesthesia  Spontaneous Ventilation: absent  Sedation level: deep  Preoxygenated: yes  Patient position: sniffing  MILS maintained throughout  Mask difficulty assessment: 0 - not attempted  Planned trial extubation    Final Airway Details  Final airway type: supraglottic airway      Successful airway: Supraglottic airway: IGel.  Size 4     Number of attempts at approach: 1  Number of other approaches attempted: 0

## 2025-02-28 NOTE — DISCHARGE INSTRUCTIONS
No weight bearing left arm / hand  Maintain splint - do not remove   Do not soak left arm in water  Monitor fever chills, pain unrelieved with medication  Percocet as needed for pain  No driving until clear with Dr. Callahan  Return to your normal diet  Follow up with Dr. Callahan as scheduled   Encourage range of motion of the digits.

## 2025-02-28 NOTE — ANESTHESIA POSTPROCEDURE EVALUATION
Patient: Aurelia Thayer    Procedure Summary       Date: 02/28/25 Room / Location: [facility] OR 03 / [facility]    Anesthesia Start: 0847 Anesthesia Stop: 1034    Procedure: ORIF, FRACTURE, RADIUS, LEFT DISTAL RADIUS (Left: Wrist) Diagnosis:       Closed Colles' fracture of left radius, initial encounter      (S52.532A)    Surgeons: Michi Callahan DO Responsible Provider: Marvin Epps MD    Anesthesia Type: general, regional ASA Status: 3            Anesthesia Type: general, regional    Vitals Value Taken Time   /59 02/28/25 1032   Temp 36.5 °C (97.7 °F) 02/28/25 1032   Pulse 72 02/28/25 1032   Resp 13 02/28/25 1032   SpO2 99 % 02/28/25 1032       Anesthesia Post Evaluation    Patient location during evaluation: PACU  Patient participation: complete - patient participated  Level of consciousness: awake  Pain management: adequate  Multimodal analgesia pain management approach  Airway patency: patent  Two or more strategies used to mitigate risk of obstructive sleep apnea  Cardiovascular status: acceptable  Respiratory status: acceptable  Hydration status: acceptable  Postoperative Nausea and Vomiting: none        No notable events documented.

## 2025-02-28 NOTE — ANESTHESIA PROCEDURE NOTES
Peripheral Block    Patient location during procedure: pre-op  Reason for block: at surgeon's request and post-op pain management  Staffing  Performed: attending   Authorized by: Marvin Epps MD    Performed by: Marvin Epps MD  Preanesthetic Checklist  Completed: patient identified, IV checked, site marked, risks and benefits discussed, surgical consent, monitors and equipment checked, pre-op evaluation and timeout performed   Timeout performed at:   Peripheral Block  Patient position: laying flat  Prep: ChloraPrep and site prepped and draped  Patient monitoring: heart rate and continuous pulse ox  Block type: infraclavicular  Injection technique: single-shot  Guidance: ultrasound guided  Needle  Needle type: short-bevel   Needle gauge: 22 G  Needle length: 5 cm  Needle localization: anatomical landmarks, ultrasound guidance and nerve stimulator  Assessment  Injection assessment: negative aspiration for heme, no paresthesia on injection, incremental injection and local visualized surrounding nerve on ultrasound  Paresthesia pain: none  Heart rate change: no  Slow fractionated injection: yes  Additional Notes  30mL 0.5% Marcaine 1:300k epi 5mg decadron

## 2025-02-28 NOTE — OP NOTE
ORIF, FRACTURE, RADIUS, LEFT DISTAL RADIUS (L) Operative Note     Date: 2025  OR Location: [facility] OR    Name: Aurelia Thayer, : 1950, Age: 74 y.o., MRN: [id number], Sex: female    Diagnosis  Pre-op Diagnosis      * Closed Colles' fracture of left radius, initial encounter [S52.532A] Post-op Diagnosis     * Closed Colles' fracture of left radius, initial encounter [S52.532A]     Procedures  ORIF, FRACTURE, RADIUS, LEFT DISTAL RADIUS  00061 - OH OPTX DSTL RADL I-ARTIC FX/EPIPHYSL SEP 3+ FRAG      Surgeons      * Michi Callahan - Primary    Resident/Fellow/Other Assistant:  Surgeons and Role:  * No surgeons found with a matching role *    Staff:   Surgical Assistant:   Circulator: Ita Narayan Person: Bhupinder  RNFA: Jeffrey  Circulator: Vidhya    Anesthesia Staff: Anesthesiologist: Marvin Epps MD  CRNA: ALEXANDRA Lan-TOM    Procedure Summary  Anesthesia: Regional, General  ASA: III  Estimated Blood Loss: 5mL  Intra-op Medications:   Administrations occurring from 0830 to 1000 on 25:   Medication Name Total Dose   ceFAZolin (Ancef) vial 1 g 2 g   dexAMETHasone (Decadron) PF injection 10 mg/mL 4 mg   fentaNYL (Sublimaze) injection 50 mcg/mL 100 mcg   LR infusion 60.83 mL   lidocaine PF (Xylocaine-MPF) local injection 1 % 50 mg   midazolam (Versed) injection 1 mg/mL 2 mg   ondansetron (Zofran) 2 mg/mL injection 4 mg   phenylephrine 40 mcg/mL syringe 10 mL 160 mcg   propofol (Diprivan) injection 10 mg/mL 120 mg   rocuronium (ZeMuron) 50 mg/5 mL injection 20 mg              Anesthesia Record               Intraprocedure I/O Totals          Intake    LR infusion 1100.00 mL    Total Intake 1100 mL       Output    Est. Blood Loss 5 mL    Total Output 5 mL       Net    Net Volume 1095 mL          Specimen: No specimens collected              Drains and/or Catheters: * None in log *    Tourniquet Times:     Total Tourniquet Time Documented:  area (laterality) - 48 minutes  Total: area (laterality)  - 48 minutes      Implants:  Implants       Type Name Action Serial No.       CANCELLOUS CHIPS Implanted [id number]     Screw GUIDEWIRE, .054 X 6 IN - [id number] Used, Not Implanted      Screw PLATE, ACU-LOC, 2 VDR, NARROW, LEFT - [id number] Implanted      Screw SCREW, HEXALOB, NON-LOCKING, 3.5MM X 12MM - [id number] Implanted      Screw SCREW, HEXALOBE, LOCKING, 3.5MM X 12MM - [id number] Implanted      Screw SCREW, CORTICAL, LOCKING, 2.3MM X 22MM - [id number] Implanted      Screw SCREW, CORTICAL, LOCKING, 2.3MM X 24MM - [id number] Implanted      Screw PEG, CORTICAL, LOCKING, 2.3MM X 14MM - [id number] Implanted      Screw PEG, CORTICAL, LOCKING, 2.3MM X 18MM - [id number] Implanted      Screw PEG, CORTICAL, LOCKING, 2.3MM X 20MM - [id number] Implanted               Findings: Highly comminuted intra-articular left distal radius fracture.  Poor bone quality noted.  Stability of the construct upon completion.  This to include the DRUJ.    Indications: Aurelia Thayer is an 74 y.o. female who is having surgery for S52.532A.     The patient was seen in the preoperative area. The risks, benefits, complications, treatment options, non-operative alternatives, expected recovery and outcomes were discussed with the patient. The possibilities of reaction to medication, pulmonary aspiration, injury to surrounding structures, bleeding, recurrent infection, the need for additional procedures, failure to diagnose a condition, and creating a complication requiring transfusion or operation were discussed with the patient. The patient concurred with the proposed plan, giving informed consent.  The site of surgery was properly noted/marked if necessary per policy. The patient has been actively warmed in preoperative area. Preoperative antibiotics have been ordered and given within 1 hours of incision. Venous thrombosis prophylaxis have been ordered including bilateral sequential compression devices    Procedure Details: After  obtaining informed surgical consent and the administration of prophylactic antibiotics the patient underwent successful regional block in the preoperative holding area.  She was then brought to the operating room and placed supine on the operative table.  Successful general anesthetic was administered.  A well-padded tourniquet was placed about the left proximal humerus.  Sterile prep and drape of the left upper extremity was completed utilizing standard orthopedic protocol.  Esmarch was utilized to exsanguinate the limb and the tourniquet was raised to 250 mmHg.  Attention turned the volar aspect of the distal forearm where a roughly 8 cm longitudinal incision was centered over the flexor carpi radialis.  Dissection carried down to the tendon sheath.  Hemostasis obtained throughout the procedure with bipolar electrocautery.  Tendon sheath incised and the FCR retracted radially.  Floor the tendon sheath incised and the FPL was identified and retracted ulnarly to protect the median nerve.  Pronator quadratus was then elevated in an ulnar fashion.  Highly comminuted and impacted distal radius fracture was identified.  Interposed tissue was removed.  Temporary percutaneous stabilization of the radial styloid to the proximal shaft was completed utilizing K wires.  To maintain the appropriately reduced position cancellous bone chips were placed and impacted into position to help stabilize and maintain radial height and inclination.  Once this had been accomplished a precontoured plate [facility] was applied and temporarily stabilized with K wires.  Adjustments in the position of the plate made with the aid of C arm fluoroscopy.  The plate was then fixated distally and then proximally providing a stable construct to the fracture site as well as to the DRUJ.  Thorough irrigation completed.  Tourniquet released immediate capillary flow returning.  Hemostasis obtained.  Wound closed with 3-0 Vicryl and 4-0 Prolene.   Xeroform gauze and a soft sterile dressing was applied.  Well-padded volar splint applied in addition to a sling.  Patient was then awakened, extubated, transferred to hospital bed and taken the postanesthesia care unit in stable condition.  Complications:  None; patient tolerated the procedure well.    Disposition: PACU - hemodynamically stable.  Condition: stable         Task Performed by RNFA or Surgical Assistant:  Aided in retraction and manipulation of the fracture site for appropriate reduction of the fracture          Additional Details: Follow-up in the [place] office on 3/7/2025 at 11:15 AM.    Attending Attestation: I performed the procedure.    Michi Callahan  Phone Number: [phone]

## 2025-02-28 NOTE — ANESTHESIA PREPROCEDURE EVALUATION
Patient: Aurelia Thayer    Procedure Information       Date/Time: 02/28/25 0830    Procedure: ORIF, FRACTURE, RADIUS, LEFT DISTAL RADIUS (Left: Wrist)    Location: [facility] / [facility]    Surgeons: Michi Callahan DO          Vitals:    02/28/25 0726   BP: 157/66   Pulse: 103   Resp: 18   Temp: 36.6 °C (97.9 °F)   SpO2: 99%       Past Surgical History:   Procedure Laterality Date    APPENDECTOMY      CHOLECYSTECTOMY      COLECTOMY  10/20/2023    large polyp removed    COLONOSCOPY  09/16/2024    COLOSTOMY  12/02/2021    COLOSTOMY CLOSURE      CT GUIDED PERCUTANEOUS PERITONEAL OR RETROPERITONEAL FLUID COLLECTION DRAINAGE  11/09/2021    CT GUIDED PERCUTANEOUS PERITONEAL OR RETROPERITONEAL FLUID COLLECTION DRAINAGE 11/9/2021 [facility]    ECTOPIC PREGNANCY SURGERY      MEDIPORT      since removed    ORIF HIP FRACTURE Left 08/2022    ORIF RADIAL SHAFT FRACTURE       Past Medical History:   Diagnosis Date    Closed Colles' fracture of left radius, initial encounter 02/25/2025    Closed fracture of left hip     Diverticulitis     Peripheral neuropathy     Tubulovillous adenoma of colon      No current facility-administered medications for this encounter.  Prior to Admission medications    Medication Sig Start Date End Date Taking? Authorizing Provider   acetaminophen (Tylenol) 325 mg tablet Take 1 tablet (325 mg) by mouth every 6 hours if needed for mild pain (1 - 3).   Yes Historical Provider, MD   cyanocobalamin (Vitamin B-12) 250 mcg tablet Take 1 tablet (250 mcg) by mouth once daily.   Yes Historical Provider, MD   docusate sodium (Colace) 100 mg capsule Take 1 capsule (100 mg) by mouth every 12 hours for 7 days. 2/25/25 3/4/25 Yes Sriram Escobar PA-C   ECHINACEA ORAL Take 1 tablet by mouth every other day.   Yes Historical Provider, MD   oxyCODONE (Roxicodone) 5 mg immediate release tablet Take 1 tablet (5 mg) by mouth every 6 hours if needed for severe pain (7 - 10) for up to 3 days. 2/25/25  2/28/25 Yes Sriram Escobar PA-C   ferrous sulfate 325 (65 Fe) MG EC tablet Take 65 mg by mouth once daily with a meal. Do not crush, chew, or split.  Patient not taking: Reported on 2/28/2025    Historical Provider, MD   gabapentin (Neurontin) 300 mg capsule Take 1 capsule (300 mg) by mouth once daily at bedtime.  Patient not taking: Reported on 2/28/2025 6/6/24 6/6/25  Reyna Mcgregor PA-C     Allergies   Allergen Reactions    Penicillins Hives     Social History     Tobacco Use    Smoking status: Never    Smokeless tobacco: Never   Substance Use Topics    Alcohol use: Yes     Comment: 1 time a month         Chemistry    Lab Results   Component Value Date/Time     12/19/2024 0920    K 5.0 12/19/2024 0920     12/19/2024 0920    CO2 28 12/19/2024 0920    BUN 19 12/19/2024 0920    CREATININE 1.17 (H) 12/19/2024 0920    Lab Results   Component Value Date/Time    CALCIUM 9.3 12/19/2024 0920    ALKPHOS 81 12/19/2024 0920    AST 19 12/19/2024 0920    ALT 15 12/19/2024 0920    BILITOT 0.5 12/19/2024 0920          Lab Results   Component Value Date/Time    WBC 7.1 12/19/2024 0920    HGB 12.3 12/19/2024 0920    HCT 39.1 12/19/2024 0920     12/19/2024 0920     Lab Results   Component Value Date/Time    PROTIME 11.6 10/13/2023 1050    INR 1.0 10/13/2023 1050     No results found for this or any previous visit (from the past 4464 hours).  No results found for this or any previous visit from the past 1095 days.    Relevant Problems   Neuro   (+) Peripheral neuropathy      GI   (+) Adenocarcinoma, colon (Multi)      Liver   (+) Adenocarcinoma, colon (Multi)      HEENT   (+) Vision loss       Clinical information reviewed:   Tobacco  Allergies  Meds   Med Hx  Surg Hx  OB Status  Fam Hx  Soc   Hx        NPO Detail:  NPO/Void Status  Carbohydrate Drink Given Prior to Surgery? : N  Date of Last Liquid: 02/27/25  Time of Last Liquid: 2200  Date of Last Solid: 02/27/25  Time of Last Solid: 1800  Last Intake  Type: Clear fluids  Time of Last Void: 0600         Physical Exam    Airway  Mallampati: II  TM distance: >3 FB     Cardiovascular - normal exam     Dental   Comments: Poor dentition multiple fractured teeth   Pulmonary - normal exam     Abdominal            Anesthesia Plan    History of general anesthesia?: yes  History of complications of general anesthesia?: no    ASA 3     general and regional     The patient is not a current smoker.  Patient was not previously instructed to abstain from smoking on day of procedure.  Patient did not smoke on day of procedure.  Education provided regarding risk of obstructive sleep apnea.  intravenous induction   Anesthetic plan and risks discussed with patient.    Plan discussed with CRNA.

## 2025-06-19 ENCOUNTER — HOSPITAL ENCOUNTER (OUTPATIENT)
Dept: RADIOLOGY | Facility: HOSPITAL | Age: 75
Discharge: HOME | End: 2025-06-19
Payer: MEDICARE

## 2025-06-19 DIAGNOSIS — D50.9 IRON DEFICIENCY ANEMIA, UNSPECIFIED IRON DEFICIENCY ANEMIA TYPE: ICD-10-CM

## 2025-06-19 DIAGNOSIS — C18.9 ADENOCARCINOMA, COLON: ICD-10-CM

## 2025-06-19 LAB
CREAT SERPL-MCNC: 1 MG/DL (ref 0.6–1.3)
GFR SERPL CREATININE-BSD FRML MDRD: 59 ML/MIN/1.73M*2

## 2025-06-19 PROCEDURE — A9698 NON-RAD CONTRAST MATERIALNOC: HCPCS | Performed by: PHYSICIAN ASSISTANT

## 2025-06-19 PROCEDURE — 82565 ASSAY OF CREATININE: CPT

## 2025-06-19 PROCEDURE — 71260 CT THORAX DX C+: CPT

## 2025-06-19 PROCEDURE — 2550000001 HC RX 255 CONTRASTS: Performed by: PHYSICIAN ASSISTANT

## 2025-06-19 RX ADMIN — IOHEXOL 75 ML: 350 INJECTION, SOLUTION INTRAVENOUS at 10:50

## 2025-06-19 RX ADMIN — IOHEXOL 500 ML: 12 SOLUTION ORAL at 10:50

## 2025-06-25 ENCOUNTER — OFFICE VISIT (OUTPATIENT)
Dept: HEMATOLOGY/ONCOLOGY | Facility: CLINIC | Age: 75
End: 2025-06-25
Payer: MEDICARE

## 2025-06-25 VITALS
RESPIRATION RATE: 16 BRPM | SYSTOLIC BLOOD PRESSURE: 127 MMHG | WEIGHT: 195.33 LBS | DIASTOLIC BLOOD PRESSURE: 75 MMHG | TEMPERATURE: 97.5 F | BODY MASS INDEX: 28.03 KG/M2 | HEART RATE: 74 BPM | OXYGEN SATURATION: 98 %

## 2025-06-25 DIAGNOSIS — K76.89 LIVER CYST: ICD-10-CM

## 2025-06-25 DIAGNOSIS — D50.9 IRON DEFICIENCY ANEMIA, UNSPECIFIED IRON DEFICIENCY ANEMIA TYPE: ICD-10-CM

## 2025-06-25 DIAGNOSIS — C18.9 ADENOCARCINOMA, COLON: Primary | ICD-10-CM

## 2025-06-25 LAB
ALBUMIN SERPL BCP-MCNC: 4.1 G/DL (ref 3.4–5)
ALP SERPL-CCNC: 71 U/L (ref 33–136)
ALT SERPL W P-5'-P-CCNC: 10 U/L (ref 7–45)
ANION GAP SERPL CALC-SCNC: 13 MMOL/L (ref 10–20)
AST SERPL W P-5'-P-CCNC: 15 U/L (ref 9–39)
BASOPHILS # BLD AUTO: 0.04 X10*3/UL (ref 0–0.1)
BASOPHILS NFR BLD AUTO: 0.7 %
BILIRUB SERPL-MCNC: 0.5 MG/DL (ref 0–1.2)
BUN SERPL-MCNC: 15 MG/DL (ref 6–23)
CALCIUM SERPL-MCNC: 9.2 MG/DL (ref 8.6–10.3)
CEA SERPL-MCNC: 1.3 UG/L
CHLORIDE SERPL-SCNC: 105 MMOL/L (ref 98–107)
CO2 SERPL-SCNC: 25 MMOL/L (ref 21–32)
CREAT SERPL-MCNC: 1.07 MG/DL (ref 0.5–1.05)
EGFRCR SERPLBLD CKD-EPI 2021: 54 ML/MIN/1.73M*2
EOSINOPHIL # BLD AUTO: 0.15 X10*3/UL (ref 0–0.4)
EOSINOPHIL NFR BLD AUTO: 2.5 %
ERYTHROCYTE [DISTWIDTH] IN BLOOD BY AUTOMATED COUNT: 13.7 % (ref 11.5–14.5)
FERRITIN SERPL-MCNC: 188 NG/ML (ref 8–150)
GLUCOSE SERPL-MCNC: 101 MG/DL (ref 74–99)
HCT VFR BLD AUTO: 37.6 % (ref 36–46)
HGB BLD-MCNC: 11.9 G/DL (ref 12–16)
IMM GRANULOCYTES # BLD AUTO: 0.02 X10*3/UL (ref 0–0.5)
IMM GRANULOCYTES NFR BLD AUTO: 0.3 % (ref 0–0.9)
IRON SATN MFR SERPL: 20 % (ref 25–45)
IRON SERPL-MCNC: 52 UG/DL (ref 35–150)
LYMPHOCYTES # BLD AUTO: 1.86 X10*3/UL (ref 0.8–3)
LYMPHOCYTES NFR BLD AUTO: 31 %
MCH RBC QN AUTO: 27.9 PG (ref 26–34)
MCHC RBC AUTO-ENTMCNC: 31.6 G/DL (ref 32–36)
MCV RBC AUTO: 88 FL (ref 80–100)
MONOCYTES # BLD AUTO: 0.47 X10*3/UL (ref 0.05–0.8)
MONOCYTES NFR BLD AUTO: 7.8 %
NEUTROPHILS # BLD AUTO: 3.46 X10*3/UL (ref 1.6–5.5)
NEUTROPHILS NFR BLD AUTO: 57.7 %
PLATELET # BLD AUTO: 233 X10*3/UL (ref 150–450)
POTASSIUM SERPL-SCNC: 4.3 MMOL/L (ref 3.5–5.3)
PROT SERPL-MCNC: 7 G/DL (ref 6.4–8.2)
RBC # BLD AUTO: 4.26 X10*6/UL (ref 4–5.2)
SODIUM SERPL-SCNC: 139 MMOL/L (ref 136–145)
TIBC SERPL-MCNC: 266 UG/DL (ref 240–445)
UIBC SERPL-MCNC: 214 UG/DL (ref 110–370)
VIT B12 SERPL-MCNC: 484 PG/ML (ref 211–911)
WBC # BLD AUTO: 6 X10*3/UL (ref 4.4–11.3)

## 2025-06-25 PROCEDURE — 83550 IRON BINDING TEST: CPT | Performed by: PHYSICIAN ASSISTANT

## 2025-06-25 PROCEDURE — 99214 OFFICE O/P EST MOD 30 MIN: CPT | Performed by: PHYSICIAN ASSISTANT

## 2025-06-25 PROCEDURE — 1126F AMNT PAIN NOTED NONE PRSNT: CPT | Performed by: PHYSICIAN ASSISTANT

## 2025-06-25 PROCEDURE — 85025 COMPLETE CBC W/AUTO DIFF WBC: CPT | Performed by: PHYSICIAN ASSISTANT

## 2025-06-25 PROCEDURE — 36415 COLL VENOUS BLD VENIPUNCTURE: CPT | Performed by: PHYSICIAN ASSISTANT

## 2025-06-25 PROCEDURE — 82728 ASSAY OF FERRITIN: CPT | Performed by: PHYSICIAN ASSISTANT

## 2025-06-25 PROCEDURE — 1160F RVW MEDS BY RX/DR IN RCRD: CPT | Performed by: PHYSICIAN ASSISTANT

## 2025-06-25 PROCEDURE — 84075 ASSAY ALKALINE PHOSPHATASE: CPT | Performed by: PHYSICIAN ASSISTANT

## 2025-06-25 PROCEDURE — 82378 CARCINOEMBRYONIC ANTIGEN: CPT | Mod: GEALAB | Performed by: PHYSICIAN ASSISTANT

## 2025-06-25 PROCEDURE — 1159F MED LIST DOCD IN RCRD: CPT | Performed by: PHYSICIAN ASSISTANT

## 2025-06-25 PROCEDURE — 82607 VITAMIN B-12: CPT | Mod: GEALAB | Performed by: PHYSICIAN ASSISTANT

## 2025-06-25 ASSESSMENT — PAIN SCALES - GENERAL: PAINLEVEL_OUTOF10: 0-NO PAIN

## 2025-06-25 NOTE — PATIENT INSTRUCTIONS
Waiting to hear back if plates in wrist are mri compatible then will schedule mri liver if not then will order pet scan but doubt anything

## 2025-06-25 NOTE — PROGRESS NOTES
Patient Visit Information:   Visit Type: Follow Up Visit     Cancer History:   Treatment Synopsis:    Patient is a pleasant 74-year-old  female who was referred by Dr. Hendricks for evaluation and management of recently diagnosed colon cancer.    Patient presented to urgent care on 11/8/2021 for fatigue/weakness, low-grade fever. Noted to have significant LLQ tenderness and was sent to ED for further evaluation. CT abd/pelv obtained at that time revealed incompletely formed abscess/bowel perforation in the LLQ, likely secondary to diverticulitis. She was admitted for abd abscess, syncope, acute diverticulitis, and UTI. She had drain placed in diverticular abscess on 11/9/2021 w/ continuation of IV abx. Completed 10 day course of moxifloxacin for UTI. Of note ECHO was unremarkable.    She returned to the ED on 11/20/2021 for worsening abd pain. CT abd/pelv at that time showed new pigtail catheter in the region of a now near completely resolved abscess/bowel perforation. Focal colonic luminal narrowing, possibly stricture secondary to scarring, at the recent inflammatory site, with proximal colon dilation and moderate-sized stool burden, raising the possibility of colon obstruction. Therefore she was admitted and repeat CT abd/pelv on 11/22/2021 revealed pigtail catheter within the left lower quadrant without residual collection seen. Associated stable appearing wall thickening and reticulation adjacent to the colon within this region appears stable since comparison CT 11/20/2021. New region of mild wall thickening of the proximal transverse colon which may indicate developing colitis. Remainder findings appear stable since comparison CT abdomen pelvis 11/20/2021.    She underwent diagnostic laparoscopy, open left colon resection, colostomy, splenic flexure mobilization, and extensive lysis of adhesions on 11/23/2021. Pathology revealed invasive moderately differentiated adenocarcinoma w/ perforation and abscess  formation. Metastatic carcinoma involving 1/25 LNs. Margins negative for dysplasia and tumor. Omentum w/ fat necrosis, acute and chronic inflammation; negative for tumor. Normal mismatch repair protein expression.    Patient recovered from surgery, reports increased flatulence. She reports ~30 lbs weight loss over the last 6 months.    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 Benign haptic and renal cysts and right renal angiomylipoma. No metastatic disease to the chest. Progressive chronic interstitial changes, could be related to post chemotherapy.     Patient presents today for follow up. completed FOLFOX 7/27/2022. Oxaliplatin was held since cycle 8 of FOLFOX, pt tolerated much better; cycle 9 of FOLFOX was held during last treatment due to neutropenia.     c/o numbness of fingers and toes.  she reports that she feeling good. good energy and appetite. fever, chills, night sweats, abdominal pain, CP, SOB, and bleeding.    In 2/2023 had reversal of colostomy.     S/P  open right colon resection and LENNY for ascending colon large polyp on 10/20/2023. Path noted to be TVA 2.3 cm, NEGATIVE FOR HIGH-GRADE DYSPLASIA AND INVASIVE ADENOCARCINOMA.  SURGICAL RESECTION MARGINS ARE NEGATIVE FOR DYSPLASIA. TWENTY (20) BENIGN LYMPH NODES.    Past medical history: colon cancer as above, diverticulitis  Past surgical history: colectomy subtotal, colostomy, cholecystectomy, appendectomy  Family history: Denies family history of cancer or blood disorders.     History of Present Illness:   Patient presents for follow up accompanied by her .  Overall she States that she feel well.  Pt has been having increased right sided abdomen pain intermittently lower side for past 3 months.   Has been very active has been working hard outside in flower beds and she thought she has just pulled a muscle.  Denies any  issues with her bowels no n/v/d, constipation or melena.       On Gabapentin and helping with neuropathy in feet. Had c-scope 9/2024: internal (grade 1) and the site of the anastomosis appeared normal. Continues on oral iron.     Review of Systems:    All of the systems have been reviewed and are negative for complaints except what is stated in the HPI and/or past medical history     Allergies and Intolerances:  Allergies   Allergen Reactions    Penicillins Hives     Outpatient Medication Profile:  Current Outpatient Medications on File Prior to Visit   Medication Sig Dispense Refill    acetaminophen (Tylenol) 325 mg tablet Take 1 tablet (325 mg) by mouth every 6 hours if needed for mild pain (1 - 3).      cyanocobalamin (Vitamin B-12) 250 mcg tablet Take 1 tablet (250 mcg) by mouth once daily.      ECHINACEA ORAL Take 1 tablet by mouth every other day.      [DISCONTINUED] oxyCODONE-acetaminophen (Percocet) 5-325 mg tablet Take 1 tablet by mouth every 6 hours if needed for severe pain (7 - 10). 15 tablet 0     No current facility-administered medications on file prior to visit.     Vitals:      2/28/2025    10:32 AM 2/28/2025    10:47 AM 2/28/2025    11:02 AM 2/28/2025    11:17 AM 2/28/2025    11:18 AM 2/28/2025    11:48 AM 6/25/2025     9:26 AM   Vitals   Systolic 148 108 129 133 135 134 127   Diastolic 59 81 54 67 53 57 75   BP Location Right arm Right arm Right arm Right arm Right arm Right arm Left arm   Heart Rate 72 81 81 78 79 80 74   Temp 36.5 °C (97.7 °F)      36.4 °C (97.5 °F)   Resp 13 12 14 16 15 16 16   Weight (lb)       195.33   BMI       28.03 kg/m2   BSA (m2)       2.09 m2   Visit Report       Report     Physical Exam:   Constitutional: alert, awake and oriented, not in acute distress   HEENT: moist mucous membranes, normal nose   Neck: supple, no lymphadenopathy   EYES: PERRL, EOM intact, conjunctiva normal  Skin: no jaundice, rash or erythema  Neurological: AAOx3, no gross focal deficit    Psychiatric: normal mood and behavior     Radiographic images:  CT chest abdomen pelvis w IV contrast 6/21/25  MPRESSION:   CHEST:   1. Scarring and/or atelectasis in the lungs bilaterally.   2. No focal infiltrate, pulmonary nodule or lymphadenopathy   identified.      ABDOMEN-PELVIS:   1. Postoperative changes, as above.   2. Hypodensities in the liver and right kidney, most consistent with   cysts.   3. Fat density lesion in the right kidney, most consistent with an   angiomyolipoma.   4. No enhancing mass or lymphadenopathy identified.   5. No evidence of bowel obstruction, free intraperitoneal air or   abnormal intra-abdominal fluid collection.          Assessment:    Patient is a pleasant 74-year-old  female who presents with stage IIIB pT4a pN1a invasive moderately differentiated adenocarcinoma of descending and sigmoid colon. s/p open left colon resection, colostomy 11/2021. Metastatic carcinoma involving 1/25 LNs. Margins negative for dysplasia and tumor. Mismatch repair protein proficient.    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  Checked with Dr Chanda vazquez for mri with plates in wrist and pt states her hip replacement is titanium.  Ok to proceed.      S/P  open right colon resection and LENNY for ascending colon large polyp on 10/20/2023. Path noted to be TVA 2.3 cm, NEGATIVE FOR HIGH-GRADE DYSPLASIA AND INVASIVE ADENOCARCINOMA.  SURGICAL RESECTION MARGINS ARE NEGATIVE FOR DYSPLASIA. TWENTY (20) BENIGN LYMPH NODES.    Plan:    Reviewed and discussed lab results with patient in detail as well as treatment options.     Continue surveillance w/CT c/a/p every 6 months     Continue to monitor CBCd, CMP, CEA, iron levels mild anemia this time pt to  start taking iron tablets again every other day she had stopped in December 2024    Continue low dose Gabapentin for b/l feel neuropathy     C-scope in 3 years- 10/2027    Schedule mri liver to evaluate liver cyst ok per ortho with hardware in wrist and hip     F/U w/PCP    RTC in 6 months with scans, labs and ov   Ov to review results of mri liver     Patient verbalized understanding, and all her questions were answered to her satisfaction     30 min spent with patient greater than 50 % of which was spent in consultation, counselling and coordination of care     Discussed plan and impression with Reyna Roche, APRN-CNP    Problem List Items Addressed This Visit           ICD-10-CM    Adenocarcinoma, colon - Primary C18.9    Relevant Orders    CT chest abdomen pelvis w and wo IV contrast    Clinic Appointment Request Follow Up    MR liver w EOVIST contrast    Clinic Appointment Request Follow Up (results of mri liver); REYNA PARKER    CBC and Auto Differential    Comprehensive Metabolic Panel    Iron and TIBC    Vitamin B12    Ferritin    CEA (Carcinoembryonic Antigen)     Other Visit Diagnoses         Codes      Iron deficiency anemia, unspecified iron deficiency anemia type     D50.9    Relevant Orders    CT chest abdomen pelvis w and wo IV contrast    Clinic Appointment Request Follow Up    Clinic Appointment Request Follow Up (results of mri liver); REYNA PARKER    CBC and Auto Differential    Comprehensive Metabolic Panel    Iron and TIBC    Vitamin B12    Ferritin    CEA (Carcinoembryonic Antigen)      Liver cyst     K76.89    Relevant Orders    MR liver w EOVIST contrast    Clinic Appointment Request Follow Up (results of mri liver); RYENA PARKER    CBC and Auto Differential    Comprehensive Metabolic Panel    Iron and TIBC    Vitamin B12    Ferritin    CEA (Carcinoembryonic Antigen)

## 2025-07-16 ENCOUNTER — HOSPITAL ENCOUNTER (OUTPATIENT)
Dept: RADIOLOGY | Facility: HOSPITAL | Age: 75
Discharge: HOME | End: 2025-07-16
Payer: MEDICARE

## 2025-07-16 DIAGNOSIS — C18.9 ADENOCARCINOMA, COLON: ICD-10-CM

## 2025-07-16 DIAGNOSIS — K76.89 LIVER CYST: ICD-10-CM

## 2025-07-16 PROCEDURE — A9581 GADOXETATE DISODIUM INJ: HCPCS | Mod: JW | Performed by: NURSE PRACTITIONER

## 2025-07-16 PROCEDURE — 74183 MRI ABD W/O CNTR FLWD CNTR: CPT

## 2025-07-16 PROCEDURE — 2500000004 HC RX 250 GENERAL PHARMACY W/ HCPCS (ALT 636 FOR OP/ED): Mod: JW | Performed by: NURSE PRACTITIONER

## 2025-07-16 RX ADMIN — GADOXETATE DISODIUM 2.25 MMOL: 181.43 INJECTION, SOLUTION INTRAVENOUS at 10:47

## 2025-07-22 ENCOUNTER — OFFICE VISIT (OUTPATIENT)
Dept: HEMATOLOGY/ONCOLOGY | Facility: CLINIC | Age: 75
End: 2025-07-22
Payer: MEDICARE

## 2025-07-22 VITALS
BODY MASS INDEX: 28.18 KG/M2 | RESPIRATION RATE: 16 BRPM | WEIGHT: 196.43 LBS | HEART RATE: 69 BPM | TEMPERATURE: 97.5 F | DIASTOLIC BLOOD PRESSURE: 79 MMHG | OXYGEN SATURATION: 99 % | SYSTOLIC BLOOD PRESSURE: 139 MMHG

## 2025-07-22 DIAGNOSIS — C18.9 ADENOCARCINOMA, COLON: ICD-10-CM

## 2025-07-22 DIAGNOSIS — D50.9 IRON DEFICIENCY ANEMIA, UNSPECIFIED IRON DEFICIENCY ANEMIA TYPE: ICD-10-CM

## 2025-07-22 DIAGNOSIS — K76.89 LIVER CYST: ICD-10-CM

## 2025-07-22 PROCEDURE — 99214 OFFICE O/P EST MOD 30 MIN: CPT | Performed by: PHYSICIAN ASSISTANT

## 2025-07-22 PROCEDURE — 1159F MED LIST DOCD IN RCRD: CPT | Performed by: PHYSICIAN ASSISTANT

## 2025-07-22 PROCEDURE — 1126F AMNT PAIN NOTED NONE PRSNT: CPT | Performed by: PHYSICIAN ASSISTANT

## 2025-07-22 ASSESSMENT — PAIN SCALES - GENERAL: PAINLEVEL_OUTOF10: 0-NO PAIN

## 2025-07-25 NOTE — PROGRESS NOTES
Patient Visit Information:   Visit Type: Follow Up Visit     Cancer History:   Treatment Synopsis:    Patient is a pleasant 74-year-old  female who was referred by Dr. Hendricks for evaluation and management of recently diagnosed colon cancer.    Patient presented to urgent care on 11/8/2021 for fatigue/weakness, low-grade fever. Noted to have significant LLQ tenderness and was sent to ED for further evaluation. CT abd/pelv obtained at that time revealed incompletely formed abscess/bowel perforation in the LLQ, likely secondary to diverticulitis. She was admitted for abd abscess, syncope, acute diverticulitis, and UTI. She had drain placed in diverticular abscess on 11/9/2021 w/ continuation of IV abx. Completed 10 day course of moxifloxacin for UTI. Of note ECHO was unremarkable.    She returned to the ED on 11/20/2021 for worsening abd pain. CT abd/pelv at that time showed new pigtail catheter in the region of a now near completely resolved abscess/bowel perforation. Focal colonic luminal narrowing, possibly stricture secondary to scarring, at the recent inflammatory site, with proximal colon dilation and moderate-sized stool burden, raising the possibility of colon obstruction. Therefore she was admitted and repeat CT abd/pelv on 11/22/2021 revealed pigtail catheter within the left lower quadrant without residual collection seen. Associated stable appearing wall thickening and reticulation adjacent to the colon within this region appears stable since comparison CT 11/20/2021. New region of mild wall thickening of the proximal transverse colon which may indicate developing colitis. Remainder findings appear stable since comparison CT abdomen pelvis 11/20/2021.    She underwent diagnostic laparoscopy, open left colon resection, colostomy, splenic flexure mobilization, and extensive lysis of adhesions on 11/23/2021. Pathology revealed invasive moderately differentiated adenocarcinoma w/ perforation and abscess  formation. Metastatic carcinoma involving 1/25 LNs. Margins negative for dysplasia and tumor. Omentum w/ fat necrosis, acute and chronic inflammation; negative for tumor. Normal mismatch repair protein expression.    Patient recovered from surgery, reports increased flatulence. She reports ~30 lbs weight loss over the last 6 months.    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 Benign haptic and renal cysts and right renal angiomylipoma. No metastatic disease to the chest. Progressive chronic interstitial changes, could be related to post chemotherapy.     Patient presents today for follow up. completed FOLFOX 7/27/2022. Oxaliplatin was held since cycle 8 of FOLFOX, pt tolerated much better; cycle 9 of FOLFOX was held during last treatment due to neutropenia.     c/o numbness of fingers and toes.  she reports that she feeling good. good energy and appetite. fever, chills, night sweats, abdominal pain, CP, SOB, and bleeding.    In 2/2023 had reversal of colostomy.     S/P  open right colon resection and LENNY for ascending colon large polyp on 10/20/2023. Path noted to be TVA 2.3 cm, NEGATIVE FOR HIGH-GRADE DYSPLASIA AND INVASIVE ADENOCARCINOMA.  SURGICAL RESECTION MARGINS ARE NEGATIVE FOR DYSPLASIA. TWENTY (20) BENIGN LYMPH NODES.    Past medical history: colon cancer as above, diverticulitis  Past surgical history: colectomy subtotal, colostomy, cholecystectomy, appendectomy  Family history: Denies family history of cancer or blood disorders.     History of Present Illness:   Patient presents for follow up accompanied by her .  Overall she States that she feel well.  Pt has been having increased right sided abdomen pain intermittently lower side for past 3 months.   Has been very active has been working hard outside in flower beds and she thought she has just pulled a muscle.  Denies any  "issues with her bowels no n/v/d, constipation or melena.       On Gabapentin and helping with neuropathy in feet. Had c-scope 9/2024: internal (grade 1) and the site of the anastomosis appeared normal. Continues on oral iron.     Review of Systems:    All of the systems have been reviewed and are negative for complaints except what is stated in the HPI and/or past medical history     Allergies and Intolerances:  Allergies   Allergen Reactions    Penicillins Hives     Outpatient Medication Profile:  Current Outpatient Medications on File Prior to Visit   Medication Sig Dispense Refill    acetaminophen (Tylenol) 325 mg tablet Take 1 tablet (325 mg) by mouth every 6 hours if needed for mild pain (1 - 3).      cyanocobalamin (Vitamin B-12) 250 mcg tablet Take 1 tablet (250 mcg) by mouth once daily.      ECHINACEA ORAL Take 1 tablet by mouth every other day.       No current facility-administered medications on file prior to visit.     Vitals:      2/28/2025    11:02 AM 2/28/2025    11:17 AM 2/28/2025    11:18 AM 2/28/2025    11:48 AM 6/25/2025     9:26 AM 7/16/2025     9:36 AM 7/22/2025    11:24 AM   Vitals   Systolic 129 133 135 134 127  139   Diastolic 54 67 53 57 75  79   BP Location Right arm Right arm Right arm Right arm Left arm  Left arm   Heart Rate 81 78 79 80 74  69   Temp     36.4 °C (97.5 °F)  36.4 °C (97.5 °F)   Resp 14 16 15 16 16  16   Height      1.778 m (5' 10\")    Weight (lb)     195.33 195 196.43   BMI     28.03 kg/m2 27.98 kg/m2 28.18 kg/m2   BSA (m2)     2.09 m2 2.09 m2 2.1 m2   Visit Report     Report  Report     Physical Exam:   Constitutional: alert, awake and oriented, not in acute distress   HEENT: moist mucous membranes, normal nose   Neck: supple, no lymphadenopathy   EYES: PERRL, EOM intact, conjunctiva normal  Skin: no jaundice, rash or erythema  Neurological: AAOx3, no gross focal deficit   Psychiatric: normal mood and behavior     Radiographic images:  MR liver w EOVIST contrast  Result " Date: 7/21/2025  Interpreted By:  Portia Santos, STUDY: MR LIVER WITH EOVIST CONTRAST;  7/16/2025 11:02 am   INDICATION: Signs/Symptoms:liver cyst.   COMPARISON: CT 06/19/2025. multiple exams since 04/11/2022   ACCESSION NUMBER(S): [id number]   ORDERING CLINICIAN: MARCO ANTONIO JOYNER   TECHNIQUE: MRI LIVER; Multiplanar magnetic resonance images of the abdomen were obtained including the following sequences; T2-weighted SSFSE with and without fat saturation, T1-weighted GRE in/opposed phase, DWI, fat saturated 3D-T1w GRE pre and dynamically post contrast. 2.25 mmol Eovist administered intravenously without immediate complication.   FINDINGS: LIVER: The liver is normal in size, signal, and contour. No fatty infiltration or cirrhotic features. Lobulated cyst in the right liver measures up to 4.9 cm. No suspicious feature. It has some minimal thin septation which corresponds to the areas of lobulation. No thickened, nodular, or enhancing septation. No peripheral enhancement. There is also a slightly lobulated cyst in the left lateral segment measuring 0.9 cm. On the delayed hepatobiliary phase images, there are no lesions evident other than the above-described cysts.   BILE DUCTS: Normal caliber bile ducts. Normal biliary excretion on the delayed images. No filling defect.   GALLBLADDER: Surgically absent.   PANCREAS: Normal signal intensity. Normal enhancement. No evidence of a mass or pancreatitis. The pancreatic duct is normal.   SPLEEN: Normal size and signal.   ADRENAL GLANDS: Within normal limits. No nodule.   KIDNEYS: Normal-sized kidneys. No hydronephrosis. There is a fat signal lesion in the right kidney, medial mid to upper pole which has an exophytic component, the portion which is exophytic is subtle and somewhat difficult to define. The exophytic portion does have some faint enhancement which distinguishes it from the retroperitoneal fat. The portion which is easier to see in the renal cortex is 1.3 cm. If the  exophytic portion is measured with a portion in the cortex s 2.9 x 2.8 cm measured on image 24 series 44, craniocaudal 4 cm coronal image 36. Series 69. Allowing for any differences in measurement technique. I do not think this has significantly changed since the 2022 exam. There are simple fluid signal cysts in both kidneys, largest right lower pole 2.1 cm.   LYMPH NODES: No lymphadenopathy.   ABDOMINAL VESSELS: Aorta is normal caliber. Major abdominal vessels demonstrate no gross abnormality.   BOWEL: Stomach and imaged portions of the large and small bowel are normal caliber. There is no obvious wall thickening or acute inflammatory process. Limited assessment otherwise. Region of known distal colonic anastomosis is not in the field of imaging.   PERITONEUM/RETROPERITONEUM: No ascites. No retroperitoneal fluid or mass.   BONES AND LOWER THORAX: No suspicious osseous lesion.   Lower chest is unremarkable.       1. There are 2 lobulated hepatic cysts present, largest in the right lobe segment 8. Neither these have any suspicious features, both are not significantly changed from multiple exams since at least 2022. no evidence of metastatic disease. 2. Simple bilateral renal cysts and right renal angiomyolipoma as discussed above.     Signed by: Portia Santos 7/21/2025 8:47 PM Dictation workstation:   [id number]    MR LIVER WITH EOVIST CONTRAST  Result Date: 7/21/2025  Per dr lester vazquez for mri Source Facility: [facility] Interpreted By:  Portia Santos, STUDY: MR LIVER WITH EOVIST CONTRAST;  7/16/2025 11:02 am    INDICATION: Signs/Symptoms:liver cyst.    COMPARISON: CT 06/19/2025. multiple exams since 04/11/2022    ACCESSION NUMBER(S): [id number]    ORDERING CLINICIAN: MARCO ANTONIO JOYNER    TECHNIQUE: MRI LIVER; Multiplanar magnetic resonance images of the abdomen were obtained including the following sequences; T2-weighted SSFSE with and without fat saturation, T1-weighted GRE in/opposed phase, DWI,  fat saturated 3D-T1w GRE pre and dynamically post contrast. 2.25 mmol Eovist administered intravenously without immediate complication.    FINDINGS: LIVER: The liver is normal in size, signal, and contour. No fatty infiltration or cirrhotic features. Lobulated cyst in the right liver measures up to 4.9 cm. No suspicious feature. It has some minimal thin septation which corresponds to the areas of lobulation. No thickened, nodular, or enhancing septation. No peripheral enhancement. There is also a slightly lobulated cyst in the left lateral segment measuring 0.9 cm. On the delayed hepatobiliary phase images, there are no lesions evident other than the above-described cysts.    BILE DUCTS: Normal caliber bile ducts. Normal biliary excretion on the delayed images. No filling defect.    GALLBLADDER: Surgically absent.    PANCREAS: Normal signal intensity. Normal enhancement. No evidence of a mass or pancreatitis. The pancreatic duct is normal.    SPLEEN: Normal size and signal.    ADRENAL GLANDS: Within normal limits. No nodule.    KIDNEYS: Normal-sized kidneys. No hydronephrosis. There is a fat signal lesion in the right kidney, medial mid to upper pole which has an exophytic component, the portion which is exophytic is subtle and somewhat difficult to define. The exophytic portion does have some faint enhancement which distinguishes it from the retroperitoneal fat. The portion which is easier to see in the renal cortex is 1.3 cm. If the exophytic portion is measured with a portion in the cortex s 2.9 x 2.8 cm measured on image 24 series 44, craniocaudal 4 cm coronal image 36. Series 69. Allowing for any differences in measurement technique. I do not think this has significantly changed since the 2022 exam. There are simple fluid signal cysts in both kidneys, largest right lower pole 2.1 cm.    LYMPH NODES: No lymphadenopathy.    ABDOMINAL VESSELS: Aorta is normal caliber. Major abdominal vessels demonstrate no gross  abnormality.    BOWEL: Stomach and imaged portions of the large and small bowel are normal caliber. There is no obvious wall thickening or acute inflammatory process. Limited assessment otherwise. Region of known distal colonic anastomosis is not in the field of imaging.    PERITONEUM/RETROPERITONEUM: No ascites. No retroperitoneal fluid or mass.    BONES AND LOWER THORAX: No suspicious osseous lesion.   Lower chest is unremarkable.    IMPRESSION: 1. There are 2 lobulated hepatic cysts present, largest in the right lobe segment 8. Neither these have any suspicious features, both are not significantly changed from multiple exams since at least 2022. no evidence of metastatic disease. 2. Simple bilateral renal cysts and right renal angiomyolipoma as discussed above.       Signed by: Portia Santos 7/21/2025 8:47 PM Dictation workstation:   [id number]       Assessment:    Patient is a pleasant 74-year-old  female who presents with stage IIIB pT4a pN1a invasive moderately differentiated adenocarcinoma of descending and sigmoid colon. s/p open left colon resection, colostomy 11/2021. Metastatic carcinoma involving 1/25 LNs. Margins negative for dysplasia and tumor. Mismatch repair protein proficient.    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  Checked with Dr Chanda vazquez for mri with plates in wrist and pt states her hip replacement is titanium.  Ok to proceed.      S/P  open right colon resection and LENNY for ascending colon large polyp on 10/20/2023. Path noted to be TVA 2.3 cm, NEGATIVE FOR HIGH-GRADE DYSPLASIA AND INVASIVE ADENOCARCINOMA.  SURGICAL RESECTION MARGINS ARE NEGATIVE FOR DYSPLASIA. TWENTY (20) BENIGN LYMPH NODES.    Plan:    Reviewed and discussed lab  results with patient in detail as well as treatment options.     Continue surveillance w/CT c/a/p every 6 months     MRI liver c/w liver cysts    Continue to monitor CBCd, CMP, CEA, iron levels mild anemia this time pt to start taking iron tablets again every other day she had stopped in December 2024    Continue low dose Gabapentin for b/l feel neuropathy     C-scope in 3 years- 10/2027    F/U w/PCP    RTC in 6 months with scans, labs and ov     Patient verbalized understanding, and all her questions were answered to her satisfaction     30 min spent with patient greater than 50 % of which was spent in consultation, counselling and coordination of care

## (undated) DEVICE — COVER, MINI DRAPE SET, C-ARM, FOR OEC/GE

## (undated) DEVICE — SUTURE, VICRYL, 3-0, 18 IN, SH, VIOLET

## (undated) DEVICE — CUTTER, PROXIMATE LINEAR RELOAD, 75MM, BLUE

## (undated) DEVICE — TIP,  ELECTRODE COATED INSULATED, EXTENDED, LF

## (undated) DEVICE — RESERVOIR, DRAINAGE, WOUND, JACKSON-PRATT, 100 CC, SILICONE

## (undated) DEVICE — CUFF, TOURNIQUET, 18 X 4, SNGL PORT/SNGL BLADDER, DISP, LF

## (undated) DEVICE — SPONGE, DISSECTOR, KITTNER BLUNT, 9/16 X .25 IN, STERILE 5 PK, ON C-5 HOLDER

## (undated) DEVICE — SUTURE, PDS II, 0, 27 IN, CT-2, VIOLET

## (undated) DEVICE — SUTURE, PROLENE 4-0, PS2, 18 IN, BLUE MONO

## (undated) DEVICE — CUTTER, PROX LINEAR, 75MM, REG TISSUE, W/ SAFETY LOCK OUT

## (undated) DEVICE — DRESSING, GAUZE, PETROLATUM, PATCH, XEROFORM, 1 X 8 IN, STERILE

## (undated) DEVICE — DRILL, SURGIBIT, QUICK COUPLER 2.0MM

## (undated) DEVICE — TRAY, FOLEY, ADVANCE, 16FR, SILICONE, W/STATLOCK

## (undated) DEVICE — DRAPE PACK, MAJOR, CUSTOM, GEAUGA

## (undated) DEVICE — NEEDLE, HYPODERMIC, REGULAR WALL, REGULAR BEVEL, 25 G X 1.5 IN

## (undated) DEVICE — BANDAGE, ELASTIC, ACE, W/CLIP, 3 IN X 5 YD, NS

## (undated) DEVICE — DRAIN, CHANNEL, HUBLESS, 1/4 ROUND FULL FLUTE, 19 FR/W 1/4" TROCAR"

## (undated) DEVICE — STAPLER, LINEAR, 3.5 60MM, RELOADABLE, BLUE

## (undated) DEVICE — Device

## (undated) DEVICE — PREP TRAY, SKIN, DRY, W/GLOVES

## (undated) DEVICE — DEVICE, VOYANT, OPEN FUSION, 20CM

## (undated) DEVICE — DRILL, QUICK RELEASE, 2.8MM

## (undated) DEVICE — COVER, TABLE, 44X90

## (undated) DEVICE — TUBING, SUCTION, CONNECTING, NON-CONDUCTIVE, SURE GRIP CONNECTORS, 3/16 IN X 10 FT

## (undated) DEVICE — HANDPIECE, POOLE SUCTION, W/O TUBING

## (undated) DEVICE — NEEDLE, SAFETY, 18 G X 1.5 IN